# Patient Record
Sex: FEMALE | Race: WHITE | NOT HISPANIC OR LATINO | Employment: UNEMPLOYED | URBAN - NONMETROPOLITAN AREA
[De-identification: names, ages, dates, MRNs, and addresses within clinical notes are randomized per-mention and may not be internally consistent; named-entity substitution may affect disease eponyms.]

---

## 2017-02-02 ENCOUNTER — OFFICE VISIT (OUTPATIENT)
Dept: FAMILY MEDICINE CLINIC | Facility: CLINIC | Age: 32
End: 2017-02-02

## 2017-02-02 VITALS
SYSTOLIC BLOOD PRESSURE: 124 MMHG | WEIGHT: 210.6 LBS | HEIGHT: 68 IN | BODY MASS INDEX: 31.92 KG/M2 | DIASTOLIC BLOOD PRESSURE: 68 MMHG | OXYGEN SATURATION: 98 % | HEART RATE: 73 BPM

## 2017-02-02 DIAGNOSIS — F41.1 GENERALIZED ANXIETY DISORDER: ICD-10-CM

## 2017-02-02 DIAGNOSIS — F32.A DEPRESSION, UNSPECIFIED DEPRESSION TYPE: Primary | ICD-10-CM

## 2017-02-02 PROCEDURE — 99212 OFFICE O/P EST SF 10 MIN: CPT | Performed by: FAMILY MEDICINE

## 2017-02-02 RX ORDER — BUPROPION HYDROCHLORIDE 150 MG/1
150 TABLET ORAL DAILY
Qty: 30 TABLET | Refills: 3 | Status: SHIPPED | OUTPATIENT
Start: 2017-02-02 | End: 2017-05-03 | Stop reason: SDUPTHER

## 2017-02-02 RX ORDER — BUSPIRONE HYDROCHLORIDE 7.5 MG/1
7.5 TABLET ORAL 2 TIMES DAILY
Qty: 60 TABLET | Refills: 1 | Status: SHIPPED | OUTPATIENT
Start: 2017-02-02 | End: 2017-04-06 | Stop reason: SDUPTHER

## 2017-02-02 NOTE — PROGRESS NOTES
"Subjective   Nicol Humphrey is a 31 y.o. female.     History of Present Illness  Patient is a 31 year  female who is presenting today for a follow up on anxiety and depression.  Patient states that she feels as though the vistaril is making her feel very jittery and would like to stop that.  States that the wellbutrin has been doing a good job at controlling her anxiety and depression.  Patient denies any suicidal and homicidal thoughts.    -mentions her last PAP smear was 3 years ago  -is still on E cig but tapering down   The following portions of the patient's history were reviewed and updated as appropriate: allergies, current medications, past family history, past medical history, past social history, past surgical history and problem list.    Review of Systems    Constitutional: no weight loss, fever, chills, weakness  HEENT: no visual loss, blurred vision, double vision, yellow scalarea  Skin: no rash, no discoloration   CVS: no chest pain, palpitations  Chest: no shortness of air, cough, dyspnea  GI: no abdominal pain, blood in stool, no nausea, vomiting, diarrhea  : no burning in urination, change in odor, no change in frequency  Neuro: no headache, dizziness, syncope, paralysis, ataxia, numbness  Musculoskeletal: no muscle, back pain, joint pain, stiffness  Lymphatics: no enlarged nodes  Endo: no reports of sweating, cold or heat intolerance, no polyuria      Objective   Physical Exam  Visit Vitals   • /68 (BP Location: Left arm, Patient Position: Sitting, Cuff Size: Adult)   • Pulse 73   • Ht 68\" (172.7 cm)   • Wt 210 lb 9.6 oz (95.5 kg)   • SpO2 98%   • BMI 32.02 kg/m2     Physical Exam    CONSTITUTIONAL: The vital signs showed that the patient was afebrile; blood pressure and heart rate were within normal limits. The patient appeared alert.  EYES: The conjunctiva was clear. The pupil was equal and reactive. There was no ptosis. The irides appeared normal.  EARS, NOSE AND THROAT: The " ears and the nose appeared normal in appearance. Hearing was grossly intact. The oropharynx showed that the mucosa was moist. There was no lesion that I could see in the palate, tongue. tonsil or posterior pharynx.  NECK: The neck was supple. The thyroid gland was not enlarged by palpation.  RESPIRATORY: The patient’s respiratory effort was normal. Auscultation of the lung showed it to be clear with good air movement.  CARDIOVASCULAR: Auscultation of the heart revealed S1 and S2 with regular rate with no murmur noted. The extremities showed no edema.      Assessment/Plan   Nicol was seen today for anxiety and depression.    Diagnoses and all orders for this visit:    Depression, unspecified depression type    Generalized anxiety disorder    Other orders  -     buPROPion XL (WELLBUTRIN XL) 150 MG 24 hr tablet; Take 1 tablet by mouth Daily.  -     busPIRone (BUSPAR) 7.5 MG tablet; Take 1 tablet by mouth 2 (Two) Times a Day.     -phq9 obtained showed a score of 9  -will stop the vistaril, and continue wellbutrin and add buspar          This document has been electronically signed by Alessandra Cao MD on February 2, 2017 1:50 PM

## 2017-02-28 RX ORDER — HYDROXYZINE PAMOATE 25 MG/1
CAPSULE ORAL
Qty: 90 CAPSULE | Refills: 1 | OUTPATIENT
Start: 2017-02-28

## 2017-04-06 RX ORDER — BUSPIRONE HYDROCHLORIDE 7.5 MG/1
7.5 TABLET ORAL 2 TIMES DAILY
Qty: 60 TABLET | Refills: 1 | Status: SHIPPED | OUTPATIENT
Start: 2017-04-06 | End: 2017-05-03 | Stop reason: SDUPTHER

## 2017-05-03 ENCOUNTER — OFFICE VISIT (OUTPATIENT)
Dept: FAMILY MEDICINE CLINIC | Facility: CLINIC | Age: 32
End: 2017-05-03

## 2017-05-03 VITALS
WEIGHT: 239.8 LBS | SYSTOLIC BLOOD PRESSURE: 122 MMHG | HEIGHT: 67 IN | BODY MASS INDEX: 37.64 KG/M2 | DIASTOLIC BLOOD PRESSURE: 78 MMHG | HEART RATE: 77 BPM | OXYGEN SATURATION: 97 %

## 2017-05-03 DIAGNOSIS — F32.A DEPRESSION, UNSPECIFIED DEPRESSION TYPE: Primary | ICD-10-CM

## 2017-05-03 PROCEDURE — 99213 OFFICE O/P EST LOW 20 MIN: CPT | Performed by: FAMILY MEDICINE

## 2017-05-03 RX ORDER — BUSPIRONE HYDROCHLORIDE 7.5 MG/1
7.5 TABLET ORAL 2 TIMES DAILY
Qty: 60 TABLET | Refills: 3 | Status: SHIPPED | OUTPATIENT
Start: 2017-05-03 | End: 2017-09-01

## 2017-05-03 RX ORDER — BUPROPION HYDROCHLORIDE 150 MG/1
150 TABLET ORAL DAILY
Qty: 30 TABLET | Refills: 3 | Status: SHIPPED | OUTPATIENT
Start: 2017-05-03 | End: 2017-09-01 | Stop reason: SDUPTHER

## 2017-05-24 ENCOUNTER — OFFICE VISIT (OUTPATIENT)
Dept: FAMILY MEDICINE CLINIC | Facility: CLINIC | Age: 32
End: 2017-05-24

## 2017-05-24 VITALS
HEART RATE: 70 BPM | SYSTOLIC BLOOD PRESSURE: 130 MMHG | BODY MASS INDEX: 36.83 KG/M2 | OXYGEN SATURATION: 98 % | HEIGHT: 68 IN | DIASTOLIC BLOOD PRESSURE: 70 MMHG | WEIGHT: 243 LBS

## 2017-05-24 DIAGNOSIS — R22.1 KNOT IN THROAT: Primary | ICD-10-CM

## 2017-05-24 PROCEDURE — 99213 OFFICE O/P EST LOW 20 MIN: CPT | Performed by: FAMILY MEDICINE

## 2017-05-24 RX ORDER — PANTOPRAZOLE SODIUM 40 MG/1
40 TABLET, DELAYED RELEASE ORAL DAILY
Qty: 30 TABLET | Refills: 2 | Status: SHIPPED | OUTPATIENT
Start: 2017-05-24 | End: 2017-09-10 | Stop reason: SDUPTHER

## 2017-08-21 ENCOUNTER — HOSPITAL ENCOUNTER (EMERGENCY)
Facility: HOSPITAL | Age: 32
Discharge: HOME | End: 2017-08-21
Attending: FAMILY MEDICINE | Admitting: FAMILY MEDICINE

## 2017-08-21 ENCOUNTER — APPOINTMENT (OUTPATIENT)
Dept: GENERAL RADIOLOGY | Facility: HOSPITAL | Age: 32
End: 2017-08-21

## 2017-08-21 VITALS
DIASTOLIC BLOOD PRESSURE: 55 MMHG | HEIGHT: 68 IN | TEMPERATURE: 98.2 F | RESPIRATION RATE: 18 BRPM | WEIGHT: 250 LBS | HEART RATE: 65 BPM | SYSTOLIC BLOOD PRESSURE: 111 MMHG | OXYGEN SATURATION: 97 % | BODY MASS INDEX: 37.89 KG/M2

## 2017-08-21 DIAGNOSIS — R07.81 PLEURODYNIA: Primary | ICD-10-CM

## 2017-08-21 LAB
ALBUMIN SERPL-MCNC: 4.1 G/DL (ref 3.4–4.8)
ALBUMIN/GLOB SERPL: 1.5 G/DL (ref 1.1–1.8)
ALP SERPL-CCNC: 68 U/L (ref 38–126)
ALT SERPL W P-5'-P-CCNC: 31 U/L (ref 9–52)
AMYLASE SERPL-CCNC: 51 U/L (ref 50–130)
ANION GAP SERPL CALCULATED.3IONS-SCNC: 8 MMOL/L (ref 5–15)
AST SERPL-CCNC: 38 U/L (ref 14–36)
B-HCG UR QL: NEGATIVE
BASOPHILS # BLD AUTO: 0.02 10*3/MM3 (ref 0–0.2)
BASOPHILS NFR BLD AUTO: 0.2 % (ref 0–2)
BILIRUB SERPL-MCNC: 0.4 MG/DL (ref 0.2–1.3)
BUN BLD-MCNC: 12 MG/DL (ref 7–21)
BUN/CREAT SERPL: 14.1 (ref 7–25)
CALCIUM SPEC-SCNC: 10.7 MG/DL (ref 8.4–10.2)
CHLORIDE SERPL-SCNC: 105 MMOL/L (ref 95–110)
CK MB SERPL-CCNC: 1.08 NG/ML (ref 0–5)
CK SERPL-CCNC: 51 U/L (ref 30–135)
CO2 SERPL-SCNC: 25 MMOL/L (ref 22–31)
CREAT BLD-MCNC: 0.85 MG/DL (ref 0.5–1)
DEPRECATED RDW RBC AUTO: 43.2 FL (ref 36.4–46.3)
EOSINOPHIL # BLD AUTO: 0.1 10*3/MM3 (ref 0–0.7)
EOSINOPHIL NFR BLD AUTO: 1.2 % (ref 0–7)
ERYTHROCYTE [DISTWIDTH] IN BLOOD BY AUTOMATED COUNT: 12.4 % (ref 11.5–14.5)
GFR SERPL CREATININE-BSD FRML MDRD: 79 ML/MIN/1.73 (ref 60–149)
GLOBULIN UR ELPH-MCNC: 2.7 GM/DL (ref 2.3–3.5)
GLUCOSE BLD-MCNC: 83 MG/DL (ref 60–100)
HCT VFR BLD AUTO: 38.5 % (ref 35–45)
HGB BLD-MCNC: 13 G/DL (ref 12–15.5)
HOLD SPECIMEN: NORMAL
HOLD SPECIMEN: NORMAL
IMM GRANULOCYTES # BLD: 0.01 10*3/MM3 (ref 0–0.02)
IMM GRANULOCYTES NFR BLD: 0.1 % (ref 0–0.5)
LIPASE SERPL-CCNC: 67 U/L (ref 23–300)
LYMPHOCYTES # BLD AUTO: 2.95 10*3/MM3 (ref 0.6–4.2)
LYMPHOCYTES NFR BLD AUTO: 34.8 % (ref 10–50)
MCH RBC QN AUTO: 32.3 PG (ref 26.5–34)
MCHC RBC AUTO-ENTMCNC: 33.8 G/DL (ref 31.4–36)
MCV RBC AUTO: 95.8 FL (ref 80–98)
MONOCYTES # BLD AUTO: 0.46 10*3/MM3 (ref 0–0.9)
MONOCYTES NFR BLD AUTO: 5.4 % (ref 0–12)
NEUTROPHILS # BLD AUTO: 4.93 10*3/MM3 (ref 2–8.6)
NEUTROPHILS NFR BLD AUTO: 58.3 % (ref 37–80)
NT-PROBNP SERPL-MCNC: 60.2 PG/ML (ref 0–450)
PLATELET # BLD AUTO: 269 10*3/MM3 (ref 150–450)
PMV BLD AUTO: 9.3 FL (ref 8–12)
POTASSIUM BLD-SCNC: 3.8 MMOL/L (ref 3.5–5.1)
PROT SERPL-MCNC: 6.8 G/DL (ref 6.3–8.6)
RBC # BLD AUTO: 4.02 10*6/MM3 (ref 3.77–5.16)
SODIUM BLD-SCNC: 138 MMOL/L (ref 137–145)
TROPONIN I SERPL-MCNC: <0.012 NG/ML
WBC NRBC COR # BLD: 8.47 10*3/MM3 (ref 3.2–9.8)
WHOLE BLOOD HOLD SPECIMEN: NORMAL
WHOLE BLOOD HOLD SPECIMEN: NORMAL

## 2017-08-21 PROCEDURE — 82553 CREATINE MB FRACTION: CPT | Performed by: FAMILY MEDICINE

## 2017-08-21 PROCEDURE — 99284 EMERGENCY DEPT VISIT MOD MDM: CPT

## 2017-08-21 PROCEDURE — 84484 ASSAY OF TROPONIN QUANT: CPT | Performed by: FAMILY MEDICINE

## 2017-08-21 PROCEDURE — 81025 URINE PREGNANCY TEST: CPT | Performed by: FAMILY MEDICINE

## 2017-08-21 PROCEDURE — 80053 COMPREHEN METABOLIC PANEL: CPT | Performed by: FAMILY MEDICINE

## 2017-08-21 PROCEDURE — 83880 ASSAY OF NATRIURETIC PEPTIDE: CPT | Performed by: FAMILY MEDICINE

## 2017-08-21 PROCEDURE — 82550 ASSAY OF CK (CPK): CPT | Performed by: FAMILY MEDICINE

## 2017-08-21 PROCEDURE — 96374 THER/PROPH/DIAG INJ IV PUSH: CPT

## 2017-08-21 PROCEDURE — 85025 COMPLETE CBC W/AUTO DIFF WBC: CPT | Performed by: FAMILY MEDICINE

## 2017-08-21 PROCEDURE — 93005 ELECTROCARDIOGRAM TRACING: CPT

## 2017-08-21 PROCEDURE — 25010000002 KETOROLAC TROMETHAMINE PER 15 MG: Performed by: FAMILY MEDICINE

## 2017-08-21 PROCEDURE — 25010000002 ONDANSETRON PER 1 MG: Performed by: FAMILY MEDICINE

## 2017-08-21 PROCEDURE — 83690 ASSAY OF LIPASE: CPT | Performed by: FAMILY MEDICINE

## 2017-08-21 PROCEDURE — 96375 TX/PRO/DX INJ NEW DRUG ADDON: CPT

## 2017-08-21 PROCEDURE — 82150 ASSAY OF AMYLASE: CPT | Performed by: FAMILY MEDICINE

## 2017-08-21 PROCEDURE — 71010 HC CHEST PA OR AP: CPT

## 2017-08-21 PROCEDURE — 93010 ELECTROCARDIOGRAM REPORT: CPT | Performed by: INTERNAL MEDICINE

## 2017-08-21 RX ORDER — ASPIRIN 325 MG
325 TABLET ORAL ONCE
Status: COMPLETED | OUTPATIENT
Start: 2017-08-21 | End: 2017-08-21

## 2017-08-21 RX ORDER — METHYLPREDNISOLONE 4 MG/1
TABLET ORAL
Qty: 21 TABLET | Refills: 0 | Status: SHIPPED | OUTPATIENT
Start: 2017-08-21 | End: 2018-04-09

## 2017-08-21 RX ORDER — ONDANSETRON 2 MG/ML
4 INJECTION INTRAMUSCULAR; INTRAVENOUS ONCE
Status: COMPLETED | OUTPATIENT
Start: 2017-08-21 | End: 2017-08-21

## 2017-08-21 RX ORDER — SODIUM CHLORIDE 0.9 % (FLUSH) 0.9 %
10 SYRINGE (ML) INJECTION AS NEEDED
Status: DISCONTINUED | OUTPATIENT
Start: 2017-08-21 | End: 2017-08-22 | Stop reason: HOSPADM

## 2017-08-21 RX ORDER — NAPROXEN 500 MG/1
500 TABLET ORAL 2 TIMES DAILY PRN
Qty: 30 TABLET | Refills: 0 | Status: SHIPPED | OUTPATIENT
Start: 2017-08-21 | End: 2018-04-09

## 2017-08-21 RX ORDER — KETOROLAC TROMETHAMINE 15 MG/ML
15 INJECTION, SOLUTION INTRAMUSCULAR; INTRAVENOUS ONCE
Status: COMPLETED | OUTPATIENT
Start: 2017-08-21 | End: 2017-08-21

## 2017-08-21 RX ORDER — AZITHROMYCIN 250 MG/1
TABLET, FILM COATED ORAL
Qty: 6 TABLET | Refills: 0 | Status: SHIPPED | OUTPATIENT
Start: 2017-08-21 | End: 2018-04-09

## 2017-08-21 RX ADMIN — ASPIRIN 325 MG: 325 TABLET, COATED ORAL at 20:23

## 2017-08-21 RX ADMIN — ONDANSETRON 4 MG: 2 INJECTION INTRAMUSCULAR; INTRAVENOUS at 21:31

## 2017-08-21 RX ADMIN — KETOROLAC TROMETHAMINE 15 MG: 15 INJECTION, SOLUTION INTRAMUSCULAR; INTRAVENOUS at 20:47

## 2017-08-22 NOTE — ED TRIAGE NOTES
Pt presents to ED per private vehicle with spouse with pt c/o chest pain and soa started this morning and went away than came back this evening.

## 2017-08-22 NOTE — ED PROVIDER NOTES
Subjective   Patient is a 31 y.o. female presenting with chest pain.   Chest Pain   Pain location:  Substernal area  Pain quality: aching    Pain radiates to:  Upper back  Pain severity:  Moderate  Onset quality:  Gradual  Duration:  13 hours  Timing:  Constant  Progression:  Unchanged  Chronicity:  New  Relieved by:  Nothing  Worsened by:  Coughing and deep breathing  Ineffective treatments:  None tried  Associated symptoms: back pain, cough, dizziness, headache, nausea and shortness of breath    Associated symptoms: no abdominal pain, no altered mental status, no anxiety, no diaphoresis, no dysphagia, no fatigue, no fever, no near-syncope, no syncope, no vomiting and no weakness    Risk factors: obesity and smoking    Risk factors: no coronary artery disease, no diabetes mellitus, no high cholesterol, no hypertension and not male        Review of Systems   Constitutional: Negative for appetite change, chills, diaphoresis, fatigue and fever.   HENT: Negative for congestion, ear discharge, ear pain, nosebleeds, rhinorrhea, sinus pressure, sore throat and trouble swallowing.    Eyes: Negative for discharge and redness.   Respiratory: Positive for cough and shortness of breath. Negative for apnea, chest tightness and wheezing.    Cardiovascular: Positive for chest pain. Negative for syncope and near-syncope.   Gastrointestinal: Positive for nausea. Negative for abdominal pain, diarrhea and vomiting.   Endocrine: Negative for polyuria.   Genitourinary: Negative for dysuria, frequency and urgency.   Musculoskeletal: Positive for back pain. Negative for myalgias and neck pain.   Skin: Negative for color change and rash.   Allergic/Immunologic: Negative for immunocompromised state.   Neurological: Positive for dizziness and headaches. Negative for seizures, syncope, weakness and light-headedness.   Hematological: Negative for adenopathy. Does not bruise/bleed easily.   Psychiatric/Behavioral: Negative for behavioral  problems and confusion.   All other systems reviewed and are negative.      Past Medical History:   Diagnosis Date   • Anxiety disorder     Anxiety disorder, unspecified      • Encounter for  visit      visit status      • Pediculosis due to pediculus humanus capitis    • Rectocele complicating  care, baby not yet delivered     Routine  care      • Supervision of other normal pregnancy    • Surgical follow-up care    • Transient hypertension of pregnancy     Transient hypertension of pregnancy - not delivered      • Upper respiratory infection    • Viral gastroenteritis        No Known Allergies    Past Surgical History:   Procedure Laterality Date   •  SECTION      Repeat low transverse  with left partial salpingectomy. 2014       • ECTOPIC PREGNANCY       Operative laparoscopy, right salpinectomy and removal of ectopic pregnancy. Right ectopic pregnancy. 2008       • INJECTION OF MEDICATION      B12 (1)      2010       • PAP SMEAR      Negative for intraepithelial lesion or malignancy. 2009       • TONSILLECTOMY AND ADENOIDECTOMY      Tonsillectomy and adenoidectomy. Chronic tonsillitis. 2000           Family History   Problem Relation Age of Onset   • Diabetes Other    • Thyroid disease Other      Grandfather and aunt        Social History     Social History   • Marital status:      Spouse name: N/A   • Number of children: N/A   • Years of education: N/A     Social History Main Topics   • Smoking status: Current Every Day Smoker   • Smokeless tobacco: None   • Alcohol use No   • Drug use: None   • Sexual activity: Not Asked     Other Topics Concern   • None     Social History Narrative           Objective   Physical Exam   Constitutional: She is oriented to person, place, and time. She appears well-developed and well-nourished.   HENT:   Head: Normocephalic and atraumatic.   Nose: Nose normal.   Mouth/Throat: Oropharynx is  clear and moist.   Eyes: Conjunctivae and EOM are normal. Pupils are equal, round, and reactive to light. Right eye exhibits no discharge. Left eye exhibits no discharge. No scleral icterus.   Neck: Normal range of motion. Neck supple. No tracheal deviation present.   Cardiovascular: Normal rate, regular rhythm and normal heart sounds.    No murmur heard.  Pulmonary/Chest: Effort normal and breath sounds normal. No stridor. No respiratory distress. She has no wheezes. She has no rales.   Abdominal: Soft. Bowel sounds are normal. She exhibits no distension and no mass. There is no tenderness. There is no rebound and no guarding.   Musculoskeletal: She exhibits no edema.   Neurological: She is alert and oriented to person, place, and time. Coordination normal.   Skin: Skin is warm and dry. No rash noted. No erythema.   Psychiatric: She has a normal mood and affect. Her behavior is normal. Thought content normal.   Nursing note and vitals reviewed.      ECG 12 Lead    Date/Time: 8/21/2017 10:10 PM  Performed by: SELWYN FLORES  Authorized by: SELWYN FLORES   Interpreted by physician  Rhythm: sinus rhythm  Rate: normal  BPM: 85  Conduction: conduction normal  ST Segments: ST segments normal                 ED Course  ED Course          Labs Reviewed   COMPREHENSIVE METABOLIC PANEL - Abnormal; Notable for the following:        Result Value    Calcium 10.7 (*)     AST (SGOT) 38 (*)     All other components within normal limits   TROPONIN (IN-HOUSE) - Normal   BNP (IN-HOUSE) - Normal   PREGNANCY, URINE - Normal   CBC WITH AUTO DIFFERENTIAL - Normal   CK - Normal   CK MB - Normal   LIPASE - Normal   AMYLASE - Normal   RAINBOW DRAW    Narrative:     The following orders were created for panel order [place] Draw.  Procedure                               Abnormality         Status                     ---------                               -----------         ------                     Light Blue Top[[id number]]                                    Final result               Green Top (Gel)[[id number]]                                  Final result               Lavender Top[[id number]]                                     Final result               Gold Top - SST[[id number]]                                   Final result                 Please view results for these tests on the individual orders.   TROPONIN (IN-HOUSE)   CBC AND DIFFERENTIAL    Narrative:     The following orders were created for panel order CBC & Differential.  Procedure                               Abnormality         Status                     ---------                               -----------         ------                     CBC Auto Differential[[id number]]        Normal              Final result                 Please view results for these tests on the individual orders.   LIGHT BLUE TOP   GREEN TOP   LAVENDER TOP   GOLD TOP - SST       XR Chest 1 View   Final Result   CONCLUSION:          1. Negative examination.                                         Electronically signed by:  YADY Storey MD  8/21/2017 8:49   PM CDT Workstation: [id number]    Final diagnoses:   Pleurodynia            Boogie Cervantes MD  08/21/17 3149

## 2017-08-25 ENCOUNTER — TELEPHONE (OUTPATIENT)
Dept: FAMILY MEDICINE CLINIC | Facility: CLINIC | Age: 32
End: 2017-08-25

## 2017-08-25 NOTE — TELEPHONE ENCOUNTER
ER FOLLOW UP  8/22/17  1037  CALLED PATIENT, SHE ALREADY HAD SCHEDULED APPOINTMENT FOR 8/23/17 @ 555, JUST REMINDED HER

## 2017-09-01 ENCOUNTER — OFFICE VISIT (OUTPATIENT)
Dept: FAMILY MEDICINE CLINIC | Facility: CLINIC | Age: 32
End: 2017-09-01

## 2017-09-01 ENCOUNTER — APPOINTMENT (OUTPATIENT)
Dept: LAB | Facility: HOSPITAL | Age: 32
End: 2017-09-01

## 2017-09-01 VITALS
HEIGHT: 68 IN | OXYGEN SATURATION: 97 % | WEIGHT: 258.06 LBS | DIASTOLIC BLOOD PRESSURE: 74 MMHG | HEART RATE: 90 BPM | SYSTOLIC BLOOD PRESSURE: 126 MMHG | BODY MASS INDEX: 39.11 KG/M2

## 2017-09-01 DIAGNOSIS — E66.01 MORBID OBESITY DUE TO EXCESS CALORIES (HCC): ICD-10-CM

## 2017-09-01 DIAGNOSIS — F32.A DEPRESSION, UNSPECIFIED DEPRESSION TYPE: ICD-10-CM

## 2017-09-01 DIAGNOSIS — F17.200 TOBACCO DEPENDENCE: ICD-10-CM

## 2017-09-01 DIAGNOSIS — F41.9 ANXIETY: Primary | ICD-10-CM

## 2017-09-01 LAB
ARTICHOKE IGE QN: 90 MG/DL (ref 1–129)
CHOLEST SERPL-MCNC: 168 MG/DL (ref 0–199)
HDLC SERPL-MCNC: 44 MG/DL (ref 60–200)
LDLC/HDLC SERPL: 1.59 {RATIO} (ref 0–3.22)
TRIGL SERPL-MCNC: 271 MG/DL (ref 20–199)

## 2017-09-01 PROCEDURE — 80061 LIPID PANEL: CPT | Performed by: FAMILY MEDICINE

## 2017-09-01 PROCEDURE — 99213 OFFICE O/P EST LOW 20 MIN: CPT | Performed by: FAMILY MEDICINE

## 2017-09-01 PROCEDURE — 36415 COLL VENOUS BLD VENIPUNCTURE: CPT | Performed by: FAMILY MEDICINE

## 2017-09-01 RX ORDER — NICOTINE 21 MG/24HR
1 PATCH, TRANSDERMAL 24 HOURS TRANSDERMAL EVERY 24 HOURS
Qty: 30 PATCH | Refills: 2 | Status: SHIPPED | OUTPATIENT
Start: 2017-09-01 | End: 2018-04-09

## 2017-09-01 RX ORDER — BUPROPION HYDROCHLORIDE 150 MG/1
150 TABLET ORAL DAILY
Qty: 30 TABLET | Refills: 3 | Status: SHIPPED | OUTPATIENT
Start: 2017-09-01 | End: 2018-01-14 | Stop reason: SDUPTHER

## 2017-09-01 RX ORDER — HYDROXYZINE HYDROCHLORIDE 25 MG/1
12.5 TABLET, FILM COATED ORAL NIGHTLY PRN
Qty: 60 TABLET | Refills: 0 | Status: SHIPPED | OUTPATIENT
Start: 2017-09-01 | End: 2017-10-11 | Stop reason: SDUPTHER

## 2017-09-02 NOTE — PROGRESS NOTES
Subjective:     Nicol Humphrey is a 31 y.o. female who presents for follow up for anxiety and depression.    Anxiety   Presents for follow-up visit. Symptoms include decreased concentration, depressed mood, excessive worry, nervous/anxious behavior, palpitations and panic. Patient reports no chest pain, compulsions, confusion, dizziness, dry mouth, feeling of choking, hyperventilation, impotence, insomnia, irritability, malaise, muscle tension, nausea, obsessions, restlessness, shortness of breath or suicidal ideas. Symptoms occur most days. The severity of symptoms is moderate. The quality of sleep is fair. Nighttime awakenings: occasional.     Her past medical history is significant for depression. Compliance with medications is %.   Depression   Visit Type: follow-up  Patient presents with the following symptoms: anhedonia, decreased concentration, depressed mood, excessive worry, fatigue, nervousness/anxiety, palpitations and panic.  Patient is not experiencing: chest pain, choking sensation, compulsions, confusion, dry mouth, hyperventilation, impotence, insomnia, irritability, malaise, muscle tension, obsessions, psychomotor agitation, psychomotor retardation, restlessness, shortness of breath, suicidal ideas, suicidal planning, thoughts of death, weight gain and weight loss.  Frequency of symptoms: constantly   Severity: moderate   Sleep quality: fair  Nighttime awakenings: occasional  Compliance with medications:  %                Preventative:  Over the past 2 weeks, have you felt down, depressed, or hopeless?Yes   Over the past 2 weeks, have you felt little interest or pleasure in doing things?Yes  Clinical depression screening refused by patient.No     On osteoporosis therapy?No     Past Medical Hx:  Past Medical History:   Diagnosis Date   • Anxiety disorder     Anxiety disorder, unspecified      • Encounter for  visit      visit status      • Pediculosis due to  pediculus humanus capitis    • Rectocele complicating  care, baby not yet delivered     Routine  care      • Supervision of other normal pregnancy    • Surgical follow-up care    • Transient hypertension of pregnancy     Transient hypertension of pregnancy - not delivered      • Upper respiratory infection    • Viral gastroenteritis        Past Surgical Hx:  Past Surgical History:   Procedure Laterality Date   •  SECTION      Repeat low transverse  with left partial salpingectomy. 2014       • ECTOPIC PREGNANCY       Operative laparoscopy, right salpinectomy and removal of ectopic pregnancy. Right ectopic pregnancy. 2008       • INJECTION OF MEDICATION      B12 (1)      2010       • PAP SMEAR      Negative for intraepithelial lesion or malignancy. 2009       • TONSILLECTOMY AND ADENOIDECTOMY      Tonsillectomy and adenoidectomy. Chronic tonsillitis. 2000           Health Maintenance:  Health Maintenance   Topic Date Due   • TDAP/TD VACCINES (1 - Tdap) 2005   • PAP SMEAR  2016   • INFLUENZA VACCINE  2017   • PNEUMOCOCCAL VACCINE (19-64 MEDIUM RISK)  Completed       Current Meds:    Current Outpatient Prescriptions:   •  azithromycin (ZITHROMAX Z-ADEN) 250 MG tablet, Take 2 tablets the first day, then 1 tablet daily for 4 days., Disp: 6 tablet, Rfl: 0  •  buPROPion XL (WELLBUTRIN XL) 150 MG 24 hr tablet, Take 1 tablet by mouth Daily., Disp: 30 tablet, Rfl: 3  •  MethylPREDNISolone (MEDROL, ADEN,) 4 MG tablet, Take as directed on package instructions., Disp: 21 tablet, Rfl: 0  •  naproxen (EC NAPROSYN) 500 MG EC tablet, Take 1 tablet by mouth 2 (Two) Times a Day As Needed for Mild Pain ., Disp: 30 tablet, Rfl: 0  •  pantoprazole (PROTONIX) 40 MG EC tablet, Take 1 tablet by mouth Daily., Disp: 30 tablet, Rfl: 2  •  hydrOXYzine (ATARAX) 25 MG tablet, Take 0.5 tablets by mouth At Night As Needed for Anxiety., Disp: 60 tablet, Rfl: 0  •   nicotine (EQL NICOTINE) 21 MG/24HR patch, Place 1 patch on the skin Daily., Disp: 30 patch, Rfl: 2  •  sertraline (ZOLOFT) 50 MG tablet, Take 1 tablet by mouth Daily., Disp: 30 tablet, Rfl: 2    Allergies:  Review of patient's allergies indicates no known allergies.    Family Hx:  Family History   Problem Relation Age of Onset   • Diabetes Other    • Thyroid disease Other      Grandfather and aunt         Social History:  Social History     Social History   • Marital status:      Spouse name: N/A   • Number of children: N/A   • Years of education: N/A     Occupational History   • Not on file.     Social History Main Topics   • Smoking status: Current Every Day Smoker   • Smokeless tobacco: Not on file   • Alcohol use No   • Drug use: Not on file   • Sexual activity: Not on file     Other Topics Concern   • Not on file     Social History Narrative       Review of Systems   Constitutional: Positive for fatigue. Negative for activity change, appetite change, fever, irritability, weight gain and weight loss.   HENT: Negative for ear pain and sore throat.    Eyes: Negative for pain and visual disturbance.   Respiratory: Negative for cough, choking and shortness of breath.    Cardiovascular: Positive for palpitations. Negative for chest pain.   Gastrointestinal: Negative for abdominal pain and nausea.   Endocrine: Negative for cold intolerance and heat intolerance.   Genitourinary: Negative for difficulty urinating, dysuria and impotence.   Musculoskeletal: Negative for arthralgias and gait problem.   Skin: Negative for color change and rash.   Neurological: Negative for dizziness, weakness and headaches.   Hematological: Negative for adenopathy. Does not bruise/bleed easily.   Psychiatric/Behavioral: Positive for decreased concentration. Negative for agitation, confusion, sleep disturbance and suicidal ideas. The patient is nervous/anxious. The patient does not have insomnia.          Objective:     /74 (BP  "Location: Left arm, Patient Position: Sitting, Cuff Size: Adult)  Pulse 90  Ht 68\" (172.7 cm)  Wt 258 lb 1 oz (117 kg)  SpO2 97%  BMI 39.24 kg/m2    Physical Exam   Constitutional: She is oriented to person, place, and time. She appears well-developed and well-nourished.   HENT:   Head: Normocephalic and atraumatic.   Eyes: EOM are normal. Pupils are equal, round, and reactive to light.   Neck: Normal range of motion. Neck supple.   Cardiovascular: Normal rate, regular rhythm and normal heart sounds.    Pulmonary/Chest: Effort normal and breath sounds normal.   Abdominal: Soft. Bowel sounds are normal. She exhibits no distension. There is no tenderness. There is no rebound.   Musculoskeletal: Normal range of motion.   Neurological: She is alert and oriented to person, place, and time.   Skin: Skin is warm.   Psychiatric: Her behavior is normal. Judgment and thought content normal. Her mood appears anxious. Her speech is rapid and/or pressured. She exhibits a depressed mood.   Nursing note and vitals reviewed.           Assessment/Plan:     Nicol was seen today for anxiety, depression and follow-up.    Diagnoses and all orders for this visit:    Anxiety    Morbid obesity due to excess calories  -     Lipid Panel    Tobacco dependence    Depression, unspecified depression type    Other orders  -     sertraline (ZOLOFT) 50 MG tablet; Take 1 tablet by mouth Daily.  -     buPROPion XL (WELLBUTRIN XL) 150 MG 24 hr tablet; Take 1 tablet by mouth Daily.  -     hydrOXYzine (ATARAX) 25 MG tablet; Take 0.5 tablets by mouth At Night As Needed for Anxiety.  -     nicotine (EQL NICOTINE) 21 MG/24HR patch; Place 1 patch on the skin Daily.          Follow-up:     Return in about 3 months (around 12/1/2017) for Recheck.    GOALS:  Quit smoking           occasional/rare  eat more fruits and vegetables, decrease soda or juice intake, increase water intake, increase physical activity, reduce screen time, reduce portion size, " cut out extra servings, reduce fast food intake, family to eat at dinner table more often, keep TV off during meals, plan meals, eat breakfast and have 3 meals a day    RISK SCORE: 4      This document has been electronically signed by Samantha Walsh MD on September 2, 2017 4:25 PM    Samantha Walsh MD, [place]  [place] [id number] [phone]

## 2017-09-11 RX ORDER — PANTOPRAZOLE SODIUM 40 MG/1
TABLET, DELAYED RELEASE ORAL
Qty: 30 TABLET | Refills: 2 | Status: SHIPPED | OUTPATIENT
Start: 2017-09-11 | End: 2017-12-08 | Stop reason: SDUPTHER

## 2017-09-11 NOTE — PROGRESS NOTES
I have seen this patient and discussed the case with resident and agree with the assessment and plan.  MICK Ortiz M.D.

## 2017-10-05 RX ORDER — HYDROXYZINE HYDROCHLORIDE 25 MG/1
TABLET, FILM COATED ORAL
Qty: 60 TABLET | Refills: 0 | Status: CANCELLED | OUTPATIENT
Start: 2017-10-05

## 2017-10-11 ENCOUNTER — TELEPHONE (OUTPATIENT)
Dept: FAMILY MEDICINE CLINIC | Facility: CLINIC | Age: 32
End: 2017-10-11

## 2017-10-11 RX ORDER — HYDROXYZINE HYDROCHLORIDE 25 MG/1
12.5 TABLET, FILM COATED ORAL NIGHTLY PRN
Qty: 60 TABLET | Refills: 0 | Status: SHIPPED | OUTPATIENT
Start: 2017-10-11 | End: 2018-02-28 | Stop reason: SDUPTHER

## 2017-10-11 NOTE — TELEPHONE ENCOUNTER
MANN FROM [facility] CALLED, PT NEEDS REFILL OF HER HYDROXYZINE 25 MG

## 2017-12-11 RX ORDER — PANTOPRAZOLE SODIUM 40 MG/1
TABLET, DELAYED RELEASE ORAL
Qty: 30 TABLET | Refills: 2 | Status: SHIPPED | OUTPATIENT
Start: 2017-12-11 | End: 2018-02-15 | Stop reason: SDUPTHER

## 2018-01-15 RX ORDER — BUPROPION HYDROCHLORIDE 150 MG/1
TABLET ORAL
Qty: 30 TABLET | Refills: 3 | Status: SHIPPED | OUTPATIENT
Start: 2018-01-15 | End: 2018-02-15 | Stop reason: SDUPTHER

## 2018-02-11 ENCOUNTER — APPOINTMENT (OUTPATIENT)
Dept: CT IMAGING | Facility: HOSPITAL | Age: 33
End: 2018-02-11

## 2018-02-11 ENCOUNTER — HOSPITAL ENCOUNTER (EMERGENCY)
Facility: HOSPITAL | Age: 33
Discharge: HOME | End: 2018-02-11
Attending: FAMILY MEDICINE | Admitting: FAMILY MEDICINE

## 2018-02-11 VITALS
HEIGHT: 68 IN | TEMPERATURE: 98.1 F | OXYGEN SATURATION: 99 % | WEIGHT: 275 LBS | BODY MASS INDEX: 41.68 KG/M2 | HEART RATE: 72 BPM | SYSTOLIC BLOOD PRESSURE: 103 MMHG | RESPIRATION RATE: 18 BRPM | DIASTOLIC BLOOD PRESSURE: 55 MMHG

## 2018-02-11 DIAGNOSIS — R42 DIZZINESS: Primary | ICD-10-CM

## 2018-02-11 LAB
ALBUMIN SERPL-MCNC: 4.2 G/DL (ref 3.4–4.8)
ALBUMIN/GLOB SERPL: 1.4 G/DL (ref 1.1–1.8)
ALP SERPL-CCNC: 71 U/L (ref 38–126)
ALT SERPL W P-5'-P-CCNC: 33 U/L (ref 9–52)
ANION GAP SERPL CALCULATED.3IONS-SCNC: 12 MMOL/L (ref 5–15)
AST SERPL-CCNC: 38 U/L (ref 14–36)
BASOPHILS # BLD AUTO: 0.01 10*3/MM3 (ref 0–0.2)
BASOPHILS NFR BLD AUTO: 0.1 % (ref 0–2)
BILIRUB SERPL-MCNC: 0.1 MG/DL (ref 0.2–1.3)
BUN BLD-MCNC: 6 MG/DL (ref 7–21)
BUN/CREAT SERPL: 10 (ref 7–25)
CALCIUM SPEC-SCNC: 10.3 MG/DL (ref 8.4–10.2)
CHLORIDE SERPL-SCNC: 101 MMOL/L (ref 95–110)
CO2 SERPL-SCNC: 27 MMOL/L (ref 22–31)
CREAT BLD-MCNC: 0.6 MG/DL (ref 0.5–1)
DEPRECATED RDW RBC AUTO: 41.4 FL (ref 36.4–46.3)
EOSINOPHIL # BLD AUTO: 0.06 10*3/MM3 (ref 0–0.7)
EOSINOPHIL NFR BLD AUTO: 0.7 % (ref 0–7)
ERYTHROCYTE [DISTWIDTH] IN BLOOD BY AUTOMATED COUNT: 12.2 % (ref 11.5–14.5)
GFR SERPL CREATININE-BSD FRML MDRD: 117 ML/MIN/1.73 (ref 60–149)
GLOBULIN UR ELPH-MCNC: 3 GM/DL (ref 2.3–3.5)
GLUCOSE BLD-MCNC: 85 MG/DL (ref 60–100)
HCT VFR BLD AUTO: 37.4 % (ref 35–45)
HGB BLD-MCNC: 12.7 G/DL (ref 12–15.5)
IMM GRANULOCYTES # BLD: 0.01 10*3/MM3 (ref 0–0.02)
IMM GRANULOCYTES NFR BLD: 0.1 % (ref 0–0.5)
LYMPHOCYTES # BLD AUTO: 2.32 10*3/MM3 (ref 0.6–4.2)
LYMPHOCYTES NFR BLD AUTO: 26.2 % (ref 10–50)
MCH RBC QN AUTO: 31.8 PG (ref 26.5–34)
MCHC RBC AUTO-ENTMCNC: 34 G/DL (ref 31.4–36)
MCV RBC AUTO: 93.7 FL (ref 80–98)
MONOCYTES # BLD AUTO: 0.7 10*3/MM3 (ref 0–0.9)
MONOCYTES NFR BLD AUTO: 7.9 % (ref 0–12)
NEUTROPHILS # BLD AUTO: 5.74 10*3/MM3 (ref 2–8.6)
NEUTROPHILS NFR BLD AUTO: 65 % (ref 37–80)
PLATELET # BLD AUTO: 303 10*3/MM3 (ref 150–450)
PMV BLD AUTO: 9.2 FL (ref 8–12)
POTASSIUM BLD-SCNC: 3.9 MMOL/L (ref 3.5–5.1)
PROT SERPL-MCNC: 7.2 G/DL (ref 6.3–8.6)
RBC # BLD AUTO: 3.99 10*6/MM3 (ref 3.77–5.16)
SODIUM BLD-SCNC: 140 MMOL/L (ref 137–145)
TROPONIN I SERPL-MCNC: <0.012 NG/ML
WBC NRBC COR # BLD: 8.84 10*3/MM3 (ref 3.2–9.8)

## 2018-02-11 PROCEDURE — 99283 EMERGENCY DEPT VISIT LOW MDM: CPT

## 2018-02-11 PROCEDURE — 80053 COMPREHEN METABOLIC PANEL: CPT | Performed by: FAMILY MEDICINE

## 2018-02-11 PROCEDURE — 93005 ELECTROCARDIOGRAM TRACING: CPT | Performed by: FAMILY MEDICINE

## 2018-02-11 PROCEDURE — 85025 COMPLETE CBC W/AUTO DIFF WBC: CPT | Performed by: FAMILY MEDICINE

## 2018-02-11 PROCEDURE — 70450 CT HEAD/BRAIN W/O DYE: CPT

## 2018-02-11 PROCEDURE — 84484 ASSAY OF TROPONIN QUANT: CPT | Performed by: FAMILY MEDICINE

## 2018-02-11 PROCEDURE — 93010 ELECTROCARDIOGRAM REPORT: CPT | Performed by: INTERNAL MEDICINE

## 2018-02-11 RX ORDER — SODIUM CHLORIDE 0.9 % (FLUSH) 0.9 %
10 SYRINGE (ML) INJECTION AS NEEDED
Status: DISCONTINUED | OUTPATIENT
Start: 2018-02-11 | End: 2018-02-11 | Stop reason: HOSPADM

## 2018-02-11 NOTE — ED PROVIDER NOTES
Subjective   Patient is a 32 y.o. female presenting with dizziness.   History provided by:  Patient   used: No    Dizziness   Quality:  Unable to specify  Severity:  Mild  Onset quality:  Gradual  Duration:  1 week  Timing:  Constant  Progression:  Unchanged  Chronicity:  New  Context: not when bending over, not with bowel movement, not with inactivity and not with loss of consciousness    Relieved by:  Nothing  Risk factors: no anemia        Review of Systems   Neurological: Positive for dizziness.   All other systems reviewed and are negative.      Past Medical History:   Diagnosis Date   • Anxiety disorder     Anxiety disorder, unspecified      • Encounter for  visit      visit status      • Pediculosis due to pediculus humanus capitis    • Rectocele complicating  care, baby not yet delivered     Routine  care      • Supervision of other normal pregnancy    • Surgical follow-up care    • Transient hypertension of pregnancy     Transient hypertension of pregnancy - not delivered      • Upper respiratory infection    • Viral gastroenteritis        No Known Allergies    Past Surgical History:   Procedure Laterality Date   •  SECTION      Repeat low transverse  with left partial salpingectomy. 2014       • ECTOPIC PREGNANCY       Operative laparoscopy, right salpinectomy and removal of ectopic pregnancy. Right ectopic pregnancy. 2008       • INJECTION OF MEDICATION      B12 (1)      2010       • PAP SMEAR      Negative for intraepithelial lesion or malignancy. 2009       • TONSILLECTOMY AND ADENOIDECTOMY      Tonsillectomy and adenoidectomy. Chronic tonsillitis. 2000       • TUBAL ABDOMINAL LIGATION         Family History   Problem Relation Age of Onset   • Diabetes Other    • Thyroid disease Other      Grandfather and aunt        Social History     Social History   • Marital status:      Spouse name: N/A   •  "Number of children: N/A   • Years of education: N/A     Social History Main Topics   • Smoking status: Current Every Day Smoker   • Smokeless tobacco: None   • Alcohol use No   • Drug use: None   • Sexual activity: Not Asked     Other Topics Concern   • None     Social History Narrative       /74  Pulse 72  Temp 98.1 °F (36.7 °C) (Oral)   Resp 18  Ht 172.7 cm (68\")  Wt 125 kg (275 lb)  SpO2 98%  BMI 41.81 kg/m2    Objective   Physical Exam   Constitutional: She is oriented to person, place, and time. She appears well-developed and well-nourished.   HENT:   Head: Normocephalic.   Right Ear: External ear normal.   Nose: Nose normal.   Mouth/Throat: Oropharynx is clear and moist.   Eyes: Conjunctivae and EOM are normal. Pupils are equal, round, and reactive to light.   Neck: Normal range of motion.   Cardiovascular: Normal rate, regular rhythm and normal heart sounds.    Pulmonary/Chest: Effort normal and breath sounds normal.   Abdominal: Soft. Bowel sounds are normal.   Musculoskeletal: Normal range of motion.   Neurological: She is alert and oriented to person, place, and time. She has normal reflexes.   Skin: Skin is warm.   Psychiatric: She has a normal mood and affect. Her behavior is normal. Judgment and thought content normal.   Nursing note and vitals reviewed.      Procedures         ED Course  ED Course      Labs Reviewed   COMPREHENSIVE METABOLIC PANEL - Abnormal; Notable for the following:        Result Value    BUN 6 (*)     Calcium 10.3 (*)     AST (SGOT) 38 (*)     Total Bilirubin 0.1 (*)     All other components within normal limits   TROPONIN (IN-HOUSE) - Normal   CBC WITH AUTO DIFFERENTIAL - Normal   CBC AND DIFFERENTIAL    Narrative:     The following orders were created for panel order CBC & Differential.  Procedure                               Abnormality         Status                     ---------                               -----------         ------                     CBC Auto " Differential[[id number]]        Normal              Final result                 Please view results for these tests on the individual orders.       CT Head Without Contrast   Final Result   CONCLUSION:     1.  Negative examination for acute intracranial pathology.             If signs or symptoms persist beyond reasonable expectations, a   MRI examination is suggested as is deemed clinically appropriate.               Electronically signed by:  YADY Storey MD  2/11/2018 6:21   PM [facility] Workstation: [id number]                  [facility]    Final diagnoses:   Dizziness            Jamie Kong MD  02/11/18 1837

## 2018-02-11 NOTE — ED NOTES
"Pt states she feels confused and her head feels like it is \"vibrating\".  Pt states she has not been ill recently or taking any medications/drugs that would alter her mental state.     Estrella Puente RN  02/11/18 6421    "

## 2018-02-15 ENCOUNTER — APPOINTMENT (OUTPATIENT)
Dept: LAB | Facility: HOSPITAL | Age: 33
End: 2018-02-15

## 2018-02-15 ENCOUNTER — OFFICE VISIT (OUTPATIENT)
Dept: FAMILY MEDICINE CLINIC | Facility: CLINIC | Age: 33
End: 2018-02-15

## 2018-02-15 VITALS
SYSTOLIC BLOOD PRESSURE: 125 MMHG | HEART RATE: 85 BPM | HEIGHT: 68 IN | OXYGEN SATURATION: 98 % | BODY MASS INDEX: 43.26 KG/M2 | WEIGHT: 285.4 LBS | DIASTOLIC BLOOD PRESSURE: 69 MMHG

## 2018-02-15 DIAGNOSIS — F32.A DEPRESSION, UNSPECIFIED DEPRESSION TYPE: ICD-10-CM

## 2018-02-15 DIAGNOSIS — F41.1 GENERALIZED ANXIETY DISORDER: Primary | ICD-10-CM

## 2018-02-15 DIAGNOSIS — E83.52 HYPERCALCEMIA: ICD-10-CM

## 2018-02-15 DIAGNOSIS — K21.9 GASTROESOPHAGEAL REFLUX DISEASE WITHOUT ESOPHAGITIS: ICD-10-CM

## 2018-02-15 PROCEDURE — 82310 ASSAY OF CALCIUM: CPT | Performed by: FAMILY MEDICINE

## 2018-02-15 PROCEDURE — 36415 COLL VENOUS BLD VENIPUNCTURE: CPT | Performed by: FAMILY MEDICINE

## 2018-02-15 PROCEDURE — 83970 ASSAY OF PARATHORMONE: CPT | Performed by: FAMILY MEDICINE

## 2018-02-15 PROCEDURE — 99213 OFFICE O/P EST LOW 20 MIN: CPT | Performed by: FAMILY MEDICINE

## 2018-02-15 RX ORDER — BUPROPION HYDROCHLORIDE 150 MG/1
150 TABLET ORAL EVERY MORNING
Qty: 30 TABLET | Refills: 3 | Status: SHIPPED | OUTPATIENT
Start: 2018-02-15 | End: 2018-10-18 | Stop reason: SDUPTHER

## 2018-02-15 RX ORDER — PANTOPRAZOLE SODIUM 40 MG/1
40 TABLET, DELAYED RELEASE ORAL DAILY
Qty: 30 TABLET | Refills: 6 | Status: SHIPPED | OUTPATIENT
Start: 2018-02-15 | End: 2018-10-09 | Stop reason: SDUPTHER

## 2018-02-15 RX ORDER — SERTRALINE HYDROCHLORIDE 25 MG/1
25 TABLET, FILM COATED ORAL DAILY
Qty: 30 TABLET | Refills: 3 | Status: SHIPPED | OUTPATIENT
Start: 2018-02-15 | End: 2018-06-03 | Stop reason: SDUPTHER

## 2018-02-15 NOTE — PROGRESS NOTES
Subjective   Nicol Humphrey is a 32 y.o. female.     HPI Comments: Patient reports that she was in the ER with dizziness and her 'head feeling funny'. Reports afterwards she realized that she was likely withdrawing from her antidepressants as she had run out and had not called her doctor about needing a refil. Reports issues getting her medications from her pharmacy.       The following portions of the patient's history were reviewed and updated as appropriate: allergies, current medications, past family history, past medical history, past social history, past surgical history and problem list.    Social History     Social History   • Marital status:      Spouse name: N/A   • Number of children: N/A   • Years of education: N/A     Occupational History   • Not on file.     Social History Main Topics   • Smoking status: Former Smoker     Quit date: 1/3/2018   • Smokeless tobacco: Not on file   • Alcohol use No   • Drug use: Not on file   • Sexual activity: Not on file     Other Topics Concern   • Not on file     Social History Narrative       Immunization History   Administered Date(s) Administered   • DTaP 05/08/1987, 04/13/1988, 05/31/1990, 08/22/1990, 08/08/1991   • Flu Vaccine Quad PF >18YRS 11/30/2016   • HiB 05/31/1990   • MMR 04/13/1988, 08/08/1991   • PPD Test 05/31/1990, 08/08/1991   • Pneumococcal Polysaccharide 11/30/2016   • Polio, Unspecified 05/08/1987, 04/13/1988, 05/31/1990, 08/08/1991        Current Outpatient Prescriptions on File Prior to Visit   Medication Sig Dispense Refill   • hydrOXYzine (ATARAX) 25 MG tablet Take 0.5 tablets by mouth At Night As Needed for Anxiety. 60 tablet 0   • [DISCONTINUED] buPROPion XL (WELLBUTRIN XL) 150 MG 24 hr tablet TAKE 1 TABLET BY MOUTH DAILY. 30 tablet 3   • [DISCONTINUED] pantoprazole (PROTONIX) 40 MG EC tablet TAKE 1 TABLET BY MOUTH DAILY. 30 tablet 2   • [DISCONTINUED] sertraline (ZOLOFT) 50 MG tablet TAKE 1 TABLET BY MOUTH DAILY. 30 tablet 2   •  azithromycin (ZITHROMAX Z-ADEN) 250 MG tablet Take 2 tablets the first day, then 1 tablet daily for 4 days. 6 tablet 0   • MethylPREDNISolone (MEDROL, ADEN,) 4 MG tablet Take as directed on package instructions. 21 tablet 0   • naproxen (EC NAPROSYN) 500 MG EC tablet Take 1 tablet by mouth 2 (Two) Times a Day As Needed for Mild Pain . 30 tablet 0   • nicotine (EQL NICOTINE) 21 MG/24HR patch Place 1 patch on the skin Daily. 30 patch 2     No current facility-administered medications on file prior to visit.        Review of Systems   Constitutional: Negative for activity change, appetite change, fatigue and fever.   HENT: Negative for ear pain and sore throat.    Eyes: Negative for pain and visual disturbance.   Respiratory: Negative for cough and shortness of breath.    Cardiovascular: Negative for chest pain and palpitations.   Gastrointestinal: Negative for abdominal pain and nausea.   Endocrine: Negative for cold intolerance and heat intolerance.   Genitourinary: Negative for difficulty urinating and dysuria.   Musculoskeletal: Negative for arthralgias and gait problem.   Skin: Negative for color change and rash.   Neurological: Positive for dizziness. Negative for weakness and headaches.   Hematological: Negative for adenopathy. Does not bruise/bleed easily.   Psychiatric/Behavioral: Positive for sleep disturbance. Negative for agitation and confusion.       Objective   Physical Exam   Constitutional: She is oriented to person, place, and time. She appears well-developed and well-nourished.   HENT:   Head: Normocephalic and atraumatic.   Eyes: Conjunctivae, EOM and lids are normal. Pupils are equal, round, and reactive to light.   Neck: Normal range of motion. Neck supple.   Cardiovascular: Normal rate, regular rhythm and normal heart sounds.  Exam reveals no gallop and no friction rub.    No murmur heard.  Pulmonary/Chest: Effort normal and breath sounds normal.   Abdominal: Soft. Normal appearance and bowel  sounds are normal. There is no tenderness.   Musculoskeletal: Normal range of motion.   Neurological: She is alert and oriented to person, place, and time.   Skin: Skin is warm, dry and intact.   Psychiatric: She has a normal mood and affect. Her speech is normal and behavior is normal. Judgment and thought content normal. Cognition and memory are normal.       Lab Results (most recent)     None          Vitals:    02/15/18 1438   BP: 125/69   Pulse: 85   SpO2: 98%       Assessment/Plan   Nicol was seen today for med refill.    Diagnoses and all orders for this visit:    Generalized anxiety disorder  -     buPROPion XL (WELLBUTRIN XL) 150 MG 24 hr tablet; Take 1 tablet by mouth Every Morning.  -     sertraline (ZOLOFT) 50 MG tablet; Take 1 tablet by mouth Daily.  -     sertraline (ZOLOFT) 25 MG tablet; Take 1 tablet by mouth Daily.    Depression, unspecified depression type  -     buPROPion XL (WELLBUTRIN XL) 150 MG 24 hr tablet; Take 1 tablet by mouth Every Morning.  -     sertraline (ZOLOFT) 50 MG tablet; Take 1 tablet by mouth Daily.  -     sertraline (ZOLOFT) 25 MG tablet; Take 1 tablet by mouth Daily.    Hypercalcemia  -     PTH, Intact & Calcium    Gastroesophageal reflux disease without esophagitis  -     pantoprazole (PROTONIX) 40 MG EC tablet; Take 1 tablet by mouth Daily.                   GOALS:  Goals        Patient Stated    • Less sadness/anxiety (pt-stated)          BARRIERS TO GOALS:  Patient reports issues getting her medications from pharmacy     Preventative:  Female Preventative: PAP is due.  up to date and documented   Recommended:Td and Influenza  former smoker  does not drink  eat more fruits and vegetables, decrease soda or juice intake, increase water intake, increase physical activity, reduce screen time and reduce portion size    RISK SCORE: 4        This document has been electronically signed by Shayna Uriarte MD on February 15, 2018 3:41 PM

## 2018-02-16 ENCOUNTER — TELEPHONE (OUTPATIENT)
Dept: FAMILY MEDICINE CLINIC | Facility: CLINIC | Age: 33
End: 2018-02-16

## 2018-02-16 DIAGNOSIS — E34.9 ELEVATED PARATHYROID HORMONE: Primary | ICD-10-CM

## 2018-02-16 LAB
CALCIUM SPEC-SCNC: 10.9 MG/DL (ref 8.4–10.2)
PTH-INTACT SERPL-MCNC: 198 PG/ML (ref 10–65)

## 2018-02-16 NOTE — TELEPHONE ENCOUNTER
-called the patient and did not get an answer, left a message to call me back  -patient has elevated PTH, ordered a nuclear scan of her neck to check for adenoma

## 2018-02-16 NOTE — TELEPHONE ENCOUNTER
Pt called said that she was seen and had some lab work done.   She is wanting to get the results from the labs.   Please call pt as soon as can.  Can reach pt at [phone]    Thanks

## 2018-02-19 ENCOUNTER — TELEPHONE (OUTPATIENT)
Dept: FAMILY MEDICINE CLINIC | Facility: CLINIC | Age: 33
End: 2018-02-19

## 2018-02-22 ENCOUNTER — APPOINTMENT (OUTPATIENT)
Dept: GENERAL RADIOLOGY | Facility: HOSPITAL | Age: 33
End: 2018-02-22

## 2018-02-22 ENCOUNTER — HOSPITAL ENCOUNTER (EMERGENCY)
Facility: HOSPITAL | Age: 33
Discharge: HOME | End: 2018-02-22
Attending: EMERGENCY MEDICINE | Admitting: EMERGENCY MEDICINE

## 2018-02-22 VITALS
DIASTOLIC BLOOD PRESSURE: 59 MMHG | HEIGHT: 68 IN | RESPIRATION RATE: 18 BRPM | OXYGEN SATURATION: 96 % | HEART RATE: 76 BPM | SYSTOLIC BLOOD PRESSURE: 124 MMHG | BODY MASS INDEX: 41.68 KG/M2 | WEIGHT: 275 LBS | TEMPERATURE: 98.3 F

## 2018-02-22 DIAGNOSIS — K52.9 GASTROENTERITIS: Primary | ICD-10-CM

## 2018-02-22 DIAGNOSIS — R07.89 NON-CARDIAC CHEST PAIN: ICD-10-CM

## 2018-02-22 LAB
ALBUMIN SERPL-MCNC: 4 G/DL (ref 3.4–4.8)
ALBUMIN/GLOB SERPL: 1.4 G/DL (ref 1.1–1.8)
ALP SERPL-CCNC: 83 U/L (ref 38–126)
ALT SERPL W P-5'-P-CCNC: 37 U/L (ref 9–52)
ANION GAP SERPL CALCULATED.3IONS-SCNC: 11 MMOL/L (ref 5–15)
AST SERPL-CCNC: 29 U/L (ref 14–36)
BASOPHILS # BLD AUTO: 0.01 10*3/MM3 (ref 0–0.2)
BASOPHILS NFR BLD AUTO: 0.2 % (ref 0–2)
BILIRUB SERPL-MCNC: 0.5 MG/DL (ref 0.2–1.3)
BUN BLD-MCNC: 15 MG/DL (ref 7–21)
BUN/CREAT SERPL: 21.4 (ref 7–25)
CALCIUM SPEC-SCNC: 9.8 MG/DL (ref 8.4–10.2)
CHLORIDE SERPL-SCNC: 102 MMOL/L (ref 95–110)
CO2 SERPL-SCNC: 23 MMOL/L (ref 22–31)
CREAT BLD-MCNC: 0.7 MG/DL (ref 0.5–1)
DEPRECATED RDW RBC AUTO: 42.7 FL (ref 36.4–46.3)
EOSINOPHIL # BLD AUTO: 0.08 10*3/MM3 (ref 0–0.7)
EOSINOPHIL NFR BLD AUTO: 1.3 % (ref 0–7)
ERYTHROCYTE [DISTWIDTH] IN BLOOD BY AUTOMATED COUNT: 12.4 % (ref 11.5–14.5)
GFR SERPL CREATININE-BSD FRML MDRD: 98 ML/MIN/1.73 (ref 64–149)
GLOBULIN UR ELPH-MCNC: 2.9 GM/DL (ref 2.3–3.5)
GLUCOSE BLD-MCNC: 94 MG/DL (ref 60–100)
HCG SERPL QL: NEGATIVE
HCT VFR BLD AUTO: 38.8 % (ref 35–45)
HGB BLD-MCNC: 13.4 G/DL (ref 12–15.5)
IMM GRANULOCYTES # BLD: 0 10*3/MM3 (ref 0–0.02)
IMM GRANULOCYTES NFR BLD: 0 % (ref 0–0.5)
LIPASE SERPL-CCNC: 52 U/L (ref 23–300)
LYMPHOCYTES # BLD AUTO: 1.14 10*3/MM3 (ref 0.6–4.2)
LYMPHOCYTES NFR BLD AUTO: 18.1 % (ref 10–50)
MCH RBC QN AUTO: 32.5 PG (ref 26.5–34)
MCHC RBC AUTO-ENTMCNC: 34.5 G/DL (ref 31.4–36)
MCV RBC AUTO: 94.2 FL (ref 80–98)
MONOCYTES # BLD AUTO: 0.38 10*3/MM3 (ref 0–0.9)
MONOCYTES NFR BLD AUTO: 6 % (ref 0–12)
NEUTROPHILS # BLD AUTO: 4.69 10*3/MM3 (ref 2–8.6)
NEUTROPHILS NFR BLD AUTO: 74.4 % (ref 37–80)
PLATELET # BLD AUTO: 306 10*3/MM3 (ref 150–450)
PMV BLD AUTO: 8.8 FL (ref 8–12)
POTASSIUM BLD-SCNC: 3.9 MMOL/L (ref 3.5–5.1)
PROT SERPL-MCNC: 6.9 G/DL (ref 6.3–8.6)
RBC # BLD AUTO: 4.12 10*6/MM3 (ref 3.77–5.16)
SODIUM BLD-SCNC: 136 MMOL/L (ref 137–145)
WBC NRBC COR # BLD: 6.3 10*3/MM3 (ref 3.2–9.8)

## 2018-02-22 PROCEDURE — 96374 THER/PROPH/DIAG INJ IV PUSH: CPT

## 2018-02-22 PROCEDURE — 96361 HYDRATE IV INFUSION ADD-ON: CPT

## 2018-02-22 PROCEDURE — 99284 EMERGENCY DEPT VISIT MOD MDM: CPT

## 2018-02-22 PROCEDURE — 25010000002 ONDANSETRON PER 1 MG: Performed by: EMERGENCY MEDICINE

## 2018-02-22 PROCEDURE — 25010000002 DIPHENHYDRAMINE PER 50 MG: Performed by: EMERGENCY MEDICINE

## 2018-02-22 PROCEDURE — 93005 ELECTROCARDIOGRAM TRACING: CPT | Performed by: EMERGENCY MEDICINE

## 2018-02-22 PROCEDURE — 71046 X-RAY EXAM CHEST 2 VIEWS: CPT

## 2018-02-22 PROCEDURE — 83690 ASSAY OF LIPASE: CPT | Performed by: EMERGENCY MEDICINE

## 2018-02-22 PROCEDURE — 25010000002 METOCLOPRAMIDE PER 10 MG: Performed by: EMERGENCY MEDICINE

## 2018-02-22 PROCEDURE — 85025 COMPLETE CBC W/AUTO DIFF WBC: CPT | Performed by: EMERGENCY MEDICINE

## 2018-02-22 PROCEDURE — 80053 COMPREHEN METABOLIC PANEL: CPT | Performed by: EMERGENCY MEDICINE

## 2018-02-22 PROCEDURE — 93010 ELECTROCARDIOGRAM REPORT: CPT | Performed by: INTERNAL MEDICINE

## 2018-02-22 PROCEDURE — 84703 CHORIONIC GONADOTROPIN ASSAY: CPT | Performed by: EMERGENCY MEDICINE

## 2018-02-22 PROCEDURE — 96375 TX/PRO/DX INJ NEW DRUG ADDON: CPT

## 2018-02-22 PROCEDURE — 25010000002 MORPHINE PER 10 MG: Performed by: EMERGENCY MEDICINE

## 2018-02-22 RX ORDER — ONDANSETRON 4 MG/1
4 TABLET, ORALLY DISINTEGRATING ORAL 4 TIMES DAILY
Qty: 15 TABLET | Refills: 0 | Status: SHIPPED | OUTPATIENT
Start: 2018-02-22 | End: 2018-04-09

## 2018-02-22 RX ORDER — ONDANSETRON 2 MG/ML
4 INJECTION INTRAMUSCULAR; INTRAVENOUS ONCE
Status: COMPLETED | OUTPATIENT
Start: 2018-02-22 | End: 2018-02-22

## 2018-02-22 RX ORDER — PANTOPRAZOLE SODIUM 40 MG/1
40 TABLET, DELAYED RELEASE ORAL 2 TIMES DAILY
Qty: 10 TABLET | Refills: 0 | Status: SHIPPED | OUTPATIENT
Start: 2018-02-22 | End: 2018-02-27

## 2018-02-22 RX ORDER — ALUMINA, MAGNESIA, AND SIMETHICONE 2400; 2400; 240 MG/30ML; MG/30ML; MG/30ML
15 SUSPENSION ORAL ONCE
Status: COMPLETED | OUTPATIENT
Start: 2018-02-22 | End: 2018-02-22

## 2018-02-22 RX ORDER — MORPHINE SULFATE 2 MG/ML
2 INJECTION, SOLUTION INTRAMUSCULAR; INTRAVENOUS ONCE
Status: COMPLETED | OUTPATIENT
Start: 2018-02-22 | End: 2018-02-22

## 2018-02-22 RX ORDER — DIPHENHYDRAMINE HYDROCHLORIDE 50 MG/ML
25 INJECTION INTRAMUSCULAR; INTRAVENOUS EVERY 6 HOURS PRN
Status: DISCONTINUED | OUTPATIENT
Start: 2018-02-22 | End: 2018-02-23 | Stop reason: HOSPADM

## 2018-02-22 RX ORDER — METOCLOPRAMIDE HYDROCHLORIDE 5 MG/ML
10 INJECTION INTRAMUSCULAR; INTRAVENOUS
Status: DISCONTINUED | OUTPATIENT
Start: 2018-02-22 | End: 2018-02-23 | Stop reason: HOSPADM

## 2018-02-22 RX ADMIN — ONDANSETRON 4 MG: 2 INJECTION INTRAMUSCULAR; INTRAVENOUS at 19:58

## 2018-02-22 RX ADMIN — LIDOCAINE HYDROCHLORIDE 15 ML: 20 SOLUTION ORAL; TOPICAL at 21:53

## 2018-02-22 RX ADMIN — SODIUM CHLORIDE 1000 ML: 900 INJECTION, SOLUTION INTRAVENOUS at 19:58

## 2018-02-22 RX ADMIN — ALUMINUM HYDROXIDE, MAGNESIUM HYDROXIDE, AND DIMETHICONE 15 ML: 400; 400; 40 SUSPENSION ORAL at 21:53

## 2018-02-22 RX ADMIN — METOCLOPRAMIDE 10 MG: 5 INJECTION, SOLUTION INTRAMUSCULAR; INTRAVENOUS at 20:34

## 2018-02-22 RX ADMIN — DIPHENHYDRAMINE HYDROCHLORIDE 25 MG: 50 INJECTION INTRAMUSCULAR; INTRAVENOUS at 20:34

## 2018-02-22 RX ADMIN — Medication 2 MG: at 19:58

## 2018-02-23 ENCOUNTER — TELEPHONE (OUTPATIENT)
Dept: FAMILY MEDICINE CLINIC | Facility: CLINIC | Age: 33
End: 2018-02-23

## 2018-02-23 NOTE — ED PROVIDER NOTES
Subjective   HPI Comments: 32-year-old female with no significant past medical history comes the emergency department after having a sudden onset of vomiting and diarrhea today.  She been vomiting multiple times and is now having chest pain as well.  There is no contacts or travel.  Nothing seems to make the symptoms better or worse.    Patient is a 32 y.o. female presenting with vomiting.   History provided by:  Patient  Vomiting   The primary symptoms include nausea, vomiting and diarrhea. Primary symptoms do not include fever, fatigue, abdominal pain, dysuria or rash. The illness began today. The onset was sudden.   The illness does not include chills or back pain. Associated symptoms comments: Chest pain  .       Review of Systems   Constitutional: Negative for chills, fatigue and fever.   HENT: Negative for congestion and sore throat.    Eyes: Negative for visual disturbance.   Respiratory: Negative for cough and shortness of breath.    Cardiovascular: Negative for chest pain and leg swelling.   Gastrointestinal: Positive for diarrhea, nausea and vomiting. Negative for abdominal pain.   Genitourinary: Negative for dysuria.   Musculoskeletal: Negative for back pain.   Skin: Negative for rash.   Neurological: Negative for dizziness and weakness.   Psychiatric/Behavioral: Negative for behavioral problems.   All other systems reviewed and are negative.      Past Medical History:   Diagnosis Date   • Anxiety disorder     Anxiety disorder, unspecified      • Depression    • Encounter for  visit      visit status      • Pediculosis due to pediculus humanus capitis    • Rectocele complicating  care, baby not yet delivered     Routine  care      • Supervision of other normal pregnancy    • Surgical follow-up care    • Transient hypertension of pregnancy     Transient hypertension of pregnancy - not delivered      • Upper respiratory infection    • Viral gastroenteritis        No Known  Allergies    Past Surgical History:   Procedure Laterality Date   •  SECTION      Repeat low transverse  with left partial salpingectomy. 2014       • CHOLECYSTECTOMY     • ECTOPIC PREGNANCY       Operative laparoscopy, right salpinectomy and removal of ectopic pregnancy. Right ectopic pregnancy. 2008       • INJECTION OF MEDICATION      B12 (1)      2010       • PAP SMEAR      Negative for intraepithelial lesion or malignancy. 2009       • TONSILLECTOMY     • TONSILLECTOMY AND ADENOIDECTOMY      Tonsillectomy and adenoidectomy. Chronic tonsillitis. 2000       • TUBAL ABDOMINAL LIGATION         Family History   Problem Relation Age of Onset   • Diabetes Other    • Thyroid disease Other      Grandfather and aunt        Social History     Social History   • Marital status:      Spouse name: N/A   • Number of children: N/A   • Years of education: N/A     Social History Main Topics   • Smoking status: Former Smoker     Quit date: 1/3/2018   • Smokeless tobacco: None   • Alcohol use No   • Drug use: No   • Sexual activity: Yes     Other Topics Concern   • None     Social History Narrative   • None           Objective   Physical Exam   Constitutional: She is oriented to person, place, and time. She appears well-developed and well-nourished.   HENT:   Head: Normocephalic and atraumatic.   Eyes: EOM are normal. Pupils are equal, round, and reactive to light.   Neck: Normal range of motion. Neck supple.   No midline tenderness   Cardiovascular: Normal rate, regular rhythm, normal heart sounds and intact distal pulses.  Exam reveals no gallop and no friction rub.    No murmur heard.  Pulmonary/Chest: Breath sounds normal. No respiratory distress. She has no wheezes. She has no rales.   No rhonchi   Abdominal: Soft. Bowel sounds are normal. She exhibits no distension. There is no tenderness.   Musculoskeletal: Normal range of motion. She exhibits no tenderness or deformity.    Neurological: She is alert and oriented to person, place, and time. No cranial nerve deficit. She exhibits normal muscle tone. Coordination normal.   Skin: Skin is warm and dry. No rash noted.   Psychiatric: She has a normal mood and affect. Her behavior is normal.   Vitals reviewed.      ECG 12 Lead    Date/Time: 2/22/2018 9:39 PM  Performed by: ROBERTO HARDING  Authorized by: ROBERTO HARDING                  ED Course  ED Course   10:04 PM patient is improved.  Pain has resolved.  The chest pain is likely due to her vomiting.  She'll be discharged home with antiemetic medication as well as a PPI.  She was instructed to follow-up with her primary care doctor in 48 hours.  Risk possibility of a Beverly-Dai tear.              MDM  Number of Diagnoses or Management Options     Amount and/or Complexity of Data Reviewed  Clinical lab tests: ordered and reviewed  Tests in the radiology section of CPT®: ordered and reviewed  Tests in the medicine section of CPT®: ordered  Obtain history from someone other than the patient: yes        Final diagnoses:   Gastroenteritis   Non-cardiac chest pain          Roberto Harding MD  02/23/18 1640

## 2018-02-27 ENCOUNTER — TELEPHONE (OUTPATIENT)
Dept: FAMILY MEDICINE CLINIC | Facility: CLINIC | Age: 33
End: 2018-02-27

## 2018-02-28 ENCOUNTER — HOSPITAL ENCOUNTER (OUTPATIENT)
Dept: NUCLEAR MEDICINE | Facility: HOSPITAL | Age: 33
Discharge: HOME | End: 2018-02-28

## 2018-02-28 PROCEDURE — 0 TECHNETIUM SESTAMIBI: Performed by: FAMILY MEDICINE

## 2018-02-28 PROCEDURE — 78070 PARATHYROID PLANAR IMAGING: CPT

## 2018-02-28 PROCEDURE — A9500 TC99M SESTAMIBI: HCPCS | Performed by: FAMILY MEDICINE

## 2018-02-28 RX ORDER — HYDROXYZINE HYDROCHLORIDE 25 MG/1
12.5 TABLET, FILM COATED ORAL NIGHTLY PRN
Qty: 60 TABLET | Refills: 1 | Status: SHIPPED | OUTPATIENT
Start: 2018-02-28 | End: 2018-05-05 | Stop reason: SDUPTHER

## 2018-02-28 RX ADMIN — TECHNETIUM TC 99M SESTAMIBI 1 DOSE: 1 INJECTION INTRAVENOUS at 12:19

## 2018-03-02 ENCOUNTER — TELEPHONE (OUTPATIENT)
Dept: FAMILY MEDICINE CLINIC | Facility: CLINIC | Age: 33
End: 2018-03-02

## 2018-04-09 ENCOUNTER — OFFICE VISIT (OUTPATIENT)
Dept: OBSTETRICS AND GYNECOLOGY | Facility: CLINIC | Age: 33
End: 2018-04-09

## 2018-04-09 VITALS
BODY MASS INDEX: 42.74 KG/M2 | WEIGHT: 282 LBS | SYSTOLIC BLOOD PRESSURE: 124 MMHG | DIASTOLIC BLOOD PRESSURE: 76 MMHG | HEIGHT: 68 IN

## 2018-04-09 DIAGNOSIS — R10.2 PELVIC PAIN: ICD-10-CM

## 2018-04-09 DIAGNOSIS — Z01.419 WELL FEMALE EXAM WITH ROUTINE GYNECOLOGICAL EXAM: Primary | ICD-10-CM

## 2018-04-09 PROCEDURE — 99385 PREV VISIT NEW AGE 18-39: CPT | Performed by: OBSTETRICS & GYNECOLOGY

## 2018-04-09 PROCEDURE — G0123 SCREEN CERV/VAG THIN LAYER: HCPCS | Performed by: OBSTETRICS & GYNECOLOGY

## 2018-04-09 PROCEDURE — 87624 HPV HI-RISK TYP POOLED RSLT: CPT | Performed by: OBSTETRICS & GYNECOLOGY

## 2018-04-09 RX ORDER — PHENTERMINE HYDROCHLORIDE 30 MG/1
30 CAPSULE ORAL EVERY MORNING
COMMUNITY
End: 2018-11-19

## 2018-04-09 NOTE — PROGRESS NOTES
Subjective     Chief Complaint   Patient presents with   • Gynecologic Exam       Nicol Humphrey is a 32 y.o.  presents today for annual exam.  Still having regular periods, no issues with cycles.  Had tubal in .  No discharge but does have right sided pelvic pain.  States it started several months ago, pain is a 6/10 on pain scale and is pretty much constant.  Nothing makes it better and nothing makes it worse.  No association with urination, bowel movements, cycles, or with diet.  Has not tried any medications to alleviate it.  States she just deals with it.      Last pap was about 13-14 years ago, no history of abnl paps or STDs.      Past Medical History:   Diagnosis Date   • Anxiety disorder     Anxiety disorder, unspecified      • Depression    • Encounter for  visit      visit status      • Pediculosis due to pediculus humanus capitis    • Rectocele complicating  care, baby not yet delivered     Routine  care      • Supervision of other normal pregnancy    • Surgical follow-up care    • Transient hypertension of pregnancy     Transient hypertension of pregnancy - not delivered      • Upper respiratory infection    • Viral gastroenteritis      Past Surgical History:   Procedure Laterality Date   •  SECTION      Repeat low transverse  with left partial salpingectomy. 2014       • CHOLECYSTECTOMY     • ECTOPIC PREGNANCY       Operative laparoscopy, right salpinectomy and removal of ectopic pregnancy. Right ectopic pregnancy. 2008       • INJECTION OF MEDICATION      B12 (1)      2010       • PAP SMEAR      Negative for intraepithelial lesion or malignancy. 2009       • TONSILLECTOMY     • TONSILLECTOMY AND ADENOIDECTOMY      Tonsillectomy and adenoidectomy. Chronic tonsillitis. 2000       • TUBAL ABDOMINAL LIGATION       Social History     Social History   • Marital status:      Spouse name: N/A   • Number of  "children: N/A   • Years of education: N/A     Occupational History   • Not on file.     Social History Main Topics   • Smoking status: Former Smoker     Quit date: 1/3/2018   • Smokeless tobacco: Not on file   • Alcohol use No   • Drug use: No   • Sexual activity: Yes     Other Topics Concern   • Not on file     Social History Narrative   • No narrative on file     Family History   Problem Relation Age of Onset   • Diabetes Other    • Thyroid disease Other      Grandfather and aunt      Review of Systems - comprehensive ROS preformed and all negative except for fatigue, difficulty sleeping, anxiety, depression.      Objective      /76   Ht 172.7 cm (68\")   Wt 128 kg (282 lb)   LMP 03/13/2018   BMI 42.88 kg/m²     Physical Exam  General:   Appears stated age, AAOx3, NAD; multiple tattoos    Heart: RRR no m/r/g   Lungs: CTAB   Breast: Symmetrical, no masses or lumps noted exam bilaterally, no skin retraction or dimpling noted. No nipple discharge.    Neck: No neck fullness, thyroid normal with no nodules noted   Abdomen: Soft, nttp, no masses palpated   Pelvis: External genitalia - NEFG  Urethra - normal appearing, no urethra caruncle or prolapse  Vagina - normal appearing vagina, no discharge or bleeding noted, no lesions.   Cervix - Normal appearing cervix, pap preformed.   Uterus - Difficult to fully appreciate uterine size due to habitus, no CMT   Adenxa - no adnexal fullness or masses noted, however, slightly ttp in adnexal regions bilaterally.  No rebound or guarding  Anus - normal appearing, small external hemorrhoids   Extremities: No CT or edema bilaterally    Neurologic: CN II - XII grossly intact       Assessment/Plan     ASSESSMENT  1. Well female exam with routine gynecological exam    2. Pelvic pain        PLAN  1. Well woman exam  - Pap with [facility] today   - Reviewed self breast exams    2. Pelvic pain   - No pathology found on physical exam  - Will obtain imaging to ensure no pelvic pathology; " briefly discussed etiologies for pelvic pain.    - RTC after imaging to discuss more    Orders Placed This Encounter   Procedures   • US Non-ob Transvaginal           New Medications Ordered This Visit   Medications   • phentermine 30 MG capsule     Sig: Take 30 mg by mouth Every Morning.       Maria L CREWS Friday, MD  4/9/2018

## 2018-04-11 LAB
LAB AP CASE REPORT: NORMAL
LAB AP GYN ADDITIONAL INFORMATION: NORMAL
Lab: NORMAL
PATH INTERP SPEC-IMP: NORMAL
STAT OF ADQ CVX/VAG CYTO-IMP: NORMAL

## 2018-04-26 LAB — HPV I/H RISK 4 DNA CVX QL PROBE+SIG AMP: NEGATIVE

## 2018-05-03 RX ORDER — HYDROXYZINE HYDROCHLORIDE 25 MG/1
TABLET, FILM COATED ORAL
Qty: 60 TABLET | Refills: 0 | Status: SHIPPED | OUTPATIENT
Start: 2018-05-03 | End: 2019-02-11 | Stop reason: SDUPTHER

## 2018-05-15 RX ORDER — HYDROXYZINE HYDROCHLORIDE 25 MG/1
TABLET, FILM COATED ORAL
Qty: 60 TABLET | Refills: 1 | Status: SHIPPED | OUTPATIENT
Start: 2018-05-15 | End: 2018-11-19

## 2018-06-03 DIAGNOSIS — F32.A DEPRESSION, UNSPECIFIED DEPRESSION TYPE: ICD-10-CM

## 2018-06-03 DIAGNOSIS — F41.1 GENERALIZED ANXIETY DISORDER: ICD-10-CM

## 2018-06-07 RX ORDER — SERTRALINE HYDROCHLORIDE 25 MG/1
25 TABLET, FILM COATED ORAL DAILY
Qty: 30 TABLET | Refills: 3 | Status: SHIPPED | OUTPATIENT
Start: 2018-06-07 | End: 2018-11-03 | Stop reason: SDUPTHER

## 2018-07-02 DIAGNOSIS — F41.1 GENERALIZED ANXIETY DISORDER: ICD-10-CM

## 2018-07-02 DIAGNOSIS — F32.A DEPRESSION, UNSPECIFIED DEPRESSION TYPE: ICD-10-CM

## 2018-10-09 DIAGNOSIS — K21.9 GASTROESOPHAGEAL REFLUX DISEASE WITHOUT ESOPHAGITIS: ICD-10-CM

## 2018-10-09 RX ORDER — PANTOPRAZOLE SODIUM 40 MG/1
40 TABLET, DELAYED RELEASE ORAL DAILY
Qty: 30 TABLET | Refills: 6 | Status: SHIPPED | OUTPATIENT
Start: 2018-10-09 | End: 2019-05-04 | Stop reason: SDUPTHER

## 2018-10-18 DIAGNOSIS — F41.1 GENERALIZED ANXIETY DISORDER: ICD-10-CM

## 2018-10-18 DIAGNOSIS — F32.A DEPRESSION, UNSPECIFIED DEPRESSION TYPE: ICD-10-CM

## 2018-10-18 RX ORDER — BUPROPION HYDROCHLORIDE 150 MG/1
TABLET ORAL
Qty: 30 TABLET | Refills: 2 | Status: SHIPPED | OUTPATIENT
Start: 2018-10-18 | End: 2019-01-07 | Stop reason: SDUPTHER

## 2018-11-03 DIAGNOSIS — F41.1 GENERALIZED ANXIETY DISORDER: ICD-10-CM

## 2018-11-03 DIAGNOSIS — F32.A DEPRESSION, UNSPECIFIED DEPRESSION TYPE: ICD-10-CM

## 2018-11-05 RX ORDER — SERTRALINE HYDROCHLORIDE 25 MG/1
TABLET, FILM COATED ORAL
Qty: 30 TABLET | Refills: 3 | Status: SHIPPED | OUTPATIENT
Start: 2018-11-05 | End: 2019-01-14 | Stop reason: DRUGHIGH

## 2018-12-08 DIAGNOSIS — F32.A DEPRESSION, UNSPECIFIED DEPRESSION TYPE: ICD-10-CM

## 2018-12-08 DIAGNOSIS — F41.1 GENERALIZED ANXIETY DISORDER: ICD-10-CM

## 2019-01-07 DIAGNOSIS — F32.A DEPRESSION, UNSPECIFIED DEPRESSION TYPE: ICD-10-CM

## 2019-01-07 DIAGNOSIS — F41.1 GENERALIZED ANXIETY DISORDER: ICD-10-CM

## 2019-01-07 RX ORDER — BUPROPION HYDROCHLORIDE 150 MG/1
TABLET ORAL
Qty: 30 TABLET | Refills: 2 | Status: SHIPPED | OUTPATIENT
Start: 2019-01-07 | End: 2019-01-14

## 2019-01-14 ENCOUNTER — LAB (OUTPATIENT)
Dept: LAB | Facility: HOSPITAL | Age: 34
End: 2019-01-14

## 2019-01-14 ENCOUNTER — OFFICE VISIT (OUTPATIENT)
Dept: FAMILY MEDICINE CLINIC | Facility: CLINIC | Age: 34
End: 2019-01-14

## 2019-01-14 VITALS
SYSTOLIC BLOOD PRESSURE: 118 MMHG | RESPIRATION RATE: 20 BRPM | HEIGHT: 62 IN | BODY MASS INDEX: 44.87 KG/M2 | TEMPERATURE: 98 F | WEIGHT: 243.8 LBS | HEART RATE: 74 BPM | OXYGEN SATURATION: 99 % | DIASTOLIC BLOOD PRESSURE: 84 MMHG

## 2019-01-14 DIAGNOSIS — Z76.89 ENCOUNTER TO ESTABLISH CARE: Primary | ICD-10-CM

## 2019-01-14 DIAGNOSIS — F41.8 ANXIETY WITH DEPRESSION: ICD-10-CM

## 2019-01-14 DIAGNOSIS — Z00.00 ENCOUNTER FOR SCREENING AND PREVENTATIVE CARE: ICD-10-CM

## 2019-01-14 LAB
ALBUMIN SERPL-MCNC: 4.4 G/DL (ref 3.4–4.8)
ALBUMIN/GLOB SERPL: 1.8 G/DL (ref 1.1–1.8)
ALP SERPL-CCNC: 71 U/L (ref 38–126)
ALT SERPL W P-5'-P-CCNC: 16 U/L (ref 9–52)
ANION GAP SERPL CALCULATED.3IONS-SCNC: 4 MMOL/L (ref 5–15)
AST SERPL-CCNC: 26 U/L (ref 14–36)
BASOPHILS # BLD AUTO: 0.02 10*3/MM3 (ref 0–0.2)
BASOPHILS NFR BLD AUTO: 0.3 % (ref 0–2)
BILIRUB SERPL-MCNC: 0.2 MG/DL (ref 0.2–1.3)
BUN BLD-MCNC: 8 MG/DL (ref 7–21)
BUN/CREAT SERPL: 11.9 (ref 7–25)
CALCIUM SPEC-SCNC: 10.7 MG/DL (ref 8.4–10.2)
CHLORIDE SERPL-SCNC: 105 MMOL/L (ref 95–110)
CO2 SERPL-SCNC: 27 MMOL/L (ref 22–31)
CREAT BLD-MCNC: 0.67 MG/DL (ref 0.5–1)
DEPRECATED RDW RBC AUTO: 44.1 FL (ref 36.4–46.3)
EOSINOPHIL # BLD AUTO: 0.09 10*3/MM3 (ref 0–0.7)
EOSINOPHIL NFR BLD AUTO: 1.3 % (ref 0–7)
ERYTHROCYTE [DISTWIDTH] IN BLOOD BY AUTOMATED COUNT: 12.6 % (ref 11.5–14.5)
GFR SERPL CREATININE-BSD FRML MDRD: 102 ML/MIN/1.73 (ref 64–149)
GLOBULIN UR ELPH-MCNC: 2.4 GM/DL (ref 2.3–3.5)
GLUCOSE BLD-MCNC: 91 MG/DL (ref 60–100)
HCT VFR BLD AUTO: 38.6 % (ref 35–45)
HGB BLD-MCNC: 13.1 G/DL (ref 12–15.5)
IMM GRANULOCYTES # BLD AUTO: 0.02 10*3/MM3 (ref 0–0.02)
IMM GRANULOCYTES NFR BLD AUTO: 0.3 % (ref 0–0.5)
LYMPHOCYTES # BLD AUTO: 2.32 10*3/MM3 (ref 0.6–4.2)
LYMPHOCYTES NFR BLD AUTO: 32.4 % (ref 10–50)
MCH RBC QN AUTO: 32.3 PG (ref 26.5–34)
MCHC RBC AUTO-ENTMCNC: 33.9 G/DL (ref 31.4–36)
MCV RBC AUTO: 95.3 FL (ref 80–98)
MONOCYTES # BLD AUTO: 0.52 10*3/MM3 (ref 0–0.9)
MONOCYTES NFR BLD AUTO: 7.3 % (ref 0–12)
NEUTROPHILS # BLD AUTO: 4.19 10*3/MM3 (ref 2–8.6)
NEUTROPHILS NFR BLD AUTO: 58.4 % (ref 37–80)
PLATELET # BLD AUTO: 353 10*3/MM3 (ref 150–450)
PMV BLD AUTO: 9.6 FL (ref 8–12)
POTASSIUM BLD-SCNC: 4.2 MMOL/L (ref 3.5–5.1)
PROT SERPL-MCNC: 6.8 G/DL (ref 6.3–8.6)
RBC # BLD AUTO: 4.05 10*6/MM3 (ref 3.77–5.16)
SODIUM BLD-SCNC: 136 MMOL/L (ref 137–145)
TSH SERPL DL<=0.05 MIU/L-ACNC: 1.88 MIU/ML (ref 0.46–4.68)
WBC NRBC COR # BLD: 7.16 10*3/MM3 (ref 3.2–9.8)

## 2019-01-14 PROCEDURE — 99213 OFFICE O/P EST LOW 20 MIN: CPT | Performed by: NURSE PRACTITIONER

## 2019-01-14 PROCEDURE — 83036 HEMOGLOBIN GLYCOSYLATED A1C: CPT

## 2019-01-14 PROCEDURE — 80050 GENERAL HEALTH PANEL: CPT

## 2019-01-14 PROCEDURE — 36415 COLL VENOUS BLD VENIPUNCTURE: CPT

## 2019-01-14 RX ORDER — BUSPIRONE HYDROCHLORIDE 10 MG/1
10 TABLET ORAL 3 TIMES DAILY
Qty: 90 TABLET | Refills: 1 | Status: SHIPPED | OUTPATIENT
Start: 2019-01-14 | End: 2019-02-11 | Stop reason: SDUPTHER

## 2019-01-14 RX ORDER — SERTRALINE HYDROCHLORIDE 100 MG/1
100 TABLET, FILM COATED ORAL DAILY
Qty: 30 TABLET | Refills: 1 | Status: SHIPPED | OUTPATIENT
Start: 2019-01-14 | End: 2019-02-11

## 2019-01-14 NOTE — PROGRESS NOTES
Subjective   Nicol Humphrey is a 33 y.o. female.     Ms. Humphrey is a 33-year-old female presents today to establish care for the management of anxiety and depression. Patient currently takes Zoloft, will Wellbutrin and hydroxyzine daily for her anxiety and depression. While she feels her depression is mostly under control. She feels that she is very stressed out and anxious most the time. She states this is causing poor quality of sleep in a short temper. She is deficient seeking to change her current therapy. Denies suicidal or homicidal ideations. No other acute complaints today.         The following portions of the patient's history were reviewed and updated as appropriate: allergies, current medications, past family history, past medical history, past social history, past surgical history and problem list.    Review of Systems   Constitutional: Negative for activity change, appetite change, chills, diaphoresis, fatigue, fever, unexpected weight gain and unexpected weight loss.   HENT: Negative for congestion, sore throat, trouble swallowing and voice change.    Eyes: Negative for blurred vision, double vision, photophobia, pain and visual disturbance.   Respiratory: Negative for cough, chest tightness, shortness of breath and wheezing.    Cardiovascular: Negative for chest pain, palpitations and leg swelling.   Gastrointestinal: Negative for abdominal distention, abdominal pain, anal bleeding, blood in stool, constipation, diarrhea, nausea, vomiting, GERD and indigestion.   Endocrine: Negative for cold intolerance, heat intolerance, polydipsia, polyphagia and polyuria.   Genitourinary: Negative for dysuria, frequency, hematuria and urgency.   Musculoskeletal: Negative for arthralgias and myalgias.   Skin: Negative for rash.   Allergic/Immunologic: Negative.    Neurological: Negative for dizziness, syncope, weakness, light-headedness and headache.   Hematological: Negative.    Psychiatric/Behavioral: Positive  for sleep disturbance and depressed mood. Negative for suicidal ideas and stress. The patient is nervous/anxious.        Objective   Physical Exam   Constitutional: She is oriented to person, place, and time. She appears well-developed and well-nourished. No distress.   HENT:   Head: Normocephalic and atraumatic.   Right Ear: External ear normal.   Left Ear: External ear normal.   Nose: Nose normal.   Mouth/Throat: Oropharynx is clear and moist.   Eyes: Conjunctivae and EOM are normal. Pupils are equal, round, and reactive to light.   Neck: Normal range of motion. Neck supple. No tracheal deviation present. No thyromegaly present.   Cardiovascular: Normal rate, regular rhythm, normal heart sounds and intact distal pulses. Exam reveals no gallop and no friction rub.   No murmur heard.  Pulmonary/Chest: Effort normal and breath sounds normal. No stridor. No respiratory distress. She has no wheezes. She has no rales.   Abdominal: Soft. Bowel sounds are normal. She exhibits no distension and no mass. There is no tenderness. There is no rebound and no guarding. No hernia.   Musculoskeletal: Normal range of motion. She exhibits no edema or tenderness.   Lymphadenopathy:     She has no cervical adenopathy.   Neurological: She is alert and oriented to person, place, and time. No cranial nerve deficit. Coordination normal.   Skin: Skin is warm and dry. No rash noted. She is not diaphoretic. No erythema. No pallor.   Psychiatric: Her behavior is normal. Judgment and thought content normal. Her mood appears anxious. Her affect is not angry, not blunt, not labile and not inappropriate. She is not actively hallucinating. Thought content is not paranoid and not delusional. Cognition and memory are normal. She does not exhibit a depressed mood. She expresses no homicidal and no suicidal ideation. She expresses no suicidal plans and no homicidal plans. She is attentive.   Nursing note and vitals reviewed.        Assessment/Plan    Nicol was seen today for establish care.    Diagnoses and all orders for this visit:    Encounter to establish care    Encounter for screening and preventative care  -     Hemoglobin A1c; Future  -     Comprehensive Metabolic Panel; Future  -     CBC & Differential; Future  -     TSH; Future    Anxiety with depression    Other orders  -     sertraline (ZOLOFT) 100 MG tablet; Take 1 tablet by mouth Daily for 30 days.  -     busPIRone (BUSPAR) 10 MG tablet; Take 1 tablet by mouth 3 (Three) Times a Day for 30 days.    1. Anxiety with depression-discontinue Wellbutrin. Patient instructed to take every other day times 1 week and then stop Wellbutrin. Patient verbalized understanding of instruction. Increase Zoloft to 100 mg by mouth daily. Will add BuSpar 10 mg 1 tablet 3 times a day. Patient started to stop medication if symptoms worsen. Patient instructed to present to the ER for suicidal or homicidal ideations. Patient verbalized understanding of instruction.    2. Health maintenance-routine labs ordered. We'll call results.    3. Follow-up in one month or sooner if needed.            This document has been electronically signed by ARIANNA Lo on January 15, 2019 12:57 PM

## 2019-01-16 LAB — HBA1C MFR BLD: 5.1 % (ref 4–5.6)

## 2019-01-16 NOTE — PROGRESS NOTES
No concerning labs. Calcium slightly elevated. We'll recheck a follow-up. Follow-up in one month as scheduled.

## 2019-02-11 ENCOUNTER — TELEPHONE (OUTPATIENT)
Dept: FAMILY MEDICINE CLINIC | Facility: CLINIC | Age: 34
End: 2019-02-11

## 2019-02-11 ENCOUNTER — DOCUMENTATION (OUTPATIENT)
Dept: FAMILY MEDICINE CLINIC | Facility: CLINIC | Age: 34
End: 2019-02-11

## 2019-02-11 ENCOUNTER — OFFICE VISIT (OUTPATIENT)
Dept: FAMILY MEDICINE CLINIC | Facility: CLINIC | Age: 34
End: 2019-02-11

## 2019-02-11 VITALS
HEIGHT: 62 IN | SYSTOLIC BLOOD PRESSURE: 130 MMHG | DIASTOLIC BLOOD PRESSURE: 82 MMHG | BODY MASS INDEX: 43.54 KG/M2 | OXYGEN SATURATION: 98 % | WEIGHT: 236.6 LBS | HEART RATE: 88 BPM | RESPIRATION RATE: 20 BRPM

## 2019-02-11 DIAGNOSIS — F41.8 ANXIETY WITH DEPRESSION: Primary | ICD-10-CM

## 2019-02-11 DIAGNOSIS — E83.52 HYPERCALCEMIA: ICD-10-CM

## 2019-02-11 PROCEDURE — 99213 OFFICE O/P EST LOW 20 MIN: CPT | Performed by: NURSE PRACTITIONER

## 2019-02-11 RX ORDER — PHENTERMINE HYDROCHLORIDE 37.5 MG/1
37.5 TABLET ORAL
COMMUNITY
End: 2019-04-17

## 2019-02-11 RX ORDER — HYDROXYZINE HYDROCHLORIDE 25 MG/1
25 TABLET, FILM COATED ORAL NIGHTLY PRN
Qty: 30 TABLET | Refills: 3 | Status: SHIPPED | OUTPATIENT
Start: 2019-02-11 | End: 2019-03-13

## 2019-02-11 RX ORDER — BUSPIRONE HYDROCHLORIDE 10 MG/1
10 TABLET ORAL 3 TIMES DAILY
Qty: 90 TABLET | Refills: 1 | Status: SHIPPED | OUTPATIENT
Start: 2019-02-11 | End: 2019-04-06 | Stop reason: SDUPTHER

## 2019-02-11 RX ORDER — SERTRALINE HYDROCHLORIDE 100 MG/1
100 TABLET, FILM COATED ORAL DAILY
Qty: 30 TABLET | Refills: 2 | Status: SHIPPED | OUTPATIENT
Start: 2019-02-11 | End: 2019-06-01 | Stop reason: SDUPTHER

## 2019-02-11 NOTE — PROGRESS NOTES
Subjective   Nicol Humphrey is a 33 y.o. female.     Ms. Humphrey is a 33-year-old female presents today for one-month follow-up related to anxiety and depression.  Last visit she was started on Zoloft 100 mg p.o. daily and BuSpar 10 mg p.o. 3 times daily.  Patient is tolerating medication well with no ill side effects.  She reports an improvement in her anxiety and depression symptoms.  Denies suicidal or homicidal ideations.         The following portions of the patient's history were reviewed and updated as appropriate: allergies, current medications, past family history, past medical history, past social history, past surgical history and problem list.    Review of Systems   Constitutional: Negative for activity change, appetite change, fatigue, unexpected weight gain and unexpected weight loss.   HENT: Negative for congestion, sore throat, trouble swallowing and voice change.    Eyes: Negative.    Respiratory: Negative for cough, chest tightness, shortness of breath and wheezing.    Cardiovascular: Negative for chest pain, palpitations and leg swelling.   Gastrointestinal: Negative for abdominal pain, constipation, diarrhea, nausea and vomiting.   Endocrine: Negative.    Genitourinary: Negative for dysuria.   Musculoskeletal: Negative for arthralgias and myalgias.   Skin: Negative for rash.   Allergic/Immunologic: Negative.    Neurological: Negative.    Hematological: Negative.         Reported improvement in anxiety and depression symptoms.  Denies suicidal or homicidal ideations.   Psychiatric/Behavioral: Positive for depressed mood. Negative for suicidal ideas. The patient is nervous/anxious.        Objective   Physical Exam   Constitutional: She is oriented to person, place, and time. She appears well-developed and well-nourished. No distress.   HENT:   Head: Normocephalic and atraumatic.   Eyes: Conjunctivae are normal.   Neck: Normal range of motion.   Cardiovascular: Normal rate, regular rhythm and normal  heart sounds.   Pulmonary/Chest: Effort normal and breath sounds normal. No respiratory distress. She has no wheezes. She has no rales.   Musculoskeletal: Normal range of motion. She exhibits no edema or tenderness.   Neurological: She is alert and oriented to person, place, and time.   Skin: Skin is warm and dry. No rash noted. She is not diaphoretic. No erythema. No pallor.   Psychiatric: She has a normal mood and affect. Her speech is normal and behavior is normal. Judgment and thought content normal. She is not actively hallucinating. Thought content is not paranoid and not delusional. She expresses no homicidal and no suicidal ideation. She expresses no suicidal plans and no homicidal plans. She is attentive.   Nursing note and vitals reviewed.        Assessment/Plan   Nicol was seen today for follow-up.    Diagnoses and all orders for this visit:    Anxiety with depression    Hypercalcemia  -     Ambulatory Referral to Endocrinology    Other orders  -     busPIRone (BUSPAR) 10 MG tablet; Take 1 tablet by mouth 3 (Three) Times a Day for 30 days.  -     sertraline (ZOLOFT) 100 MG tablet; Take 1 tablet by mouth Daily for 30 days.  -     hydrOXYzine (ATARAX) 25 MG tablet; Take 1 tablet by mouth At Night As Needed for Itching for up to 30 days.      1.  Anxiety with depression-controlled.  Refill BuSpar 10 mg 1 tablet 3 times daily.  Zoloft 100 mg 1 tablet p.o. daily.  Patient instructed to stop medication if symptoms worsen.  Patient instructed to present to the ER if she develops suicidal or homicidal ideations.  Patient verbalized understanding of instruction.    2.  Hypercalcemia-after reviewing patient's previous labs it is noted she has had intermittent elevated calcium levels in 1 previous elevated PTH level.  She did have a parathyroid scan by previous provider which was negative per radiology report.  She had no additional follow-up at that point.  Referral to endocrinology for further evaluation and  possible referral to general surgery.    3.  Follow-up in 3 months or sooner if needed.            This document has been electronically signed by ARIANNA Lo on February 11, 2019 4:12 PM

## 2019-02-11 NOTE — TELEPHONE ENCOUNTER
Per ARIANNA Slade, Nicol Humphrey was called to inform her that a referral was sent to Endocrinology for further evaluation due to elevated calcium levels. Endocrinology to call to schedule appt. Patient verbalized understanding.      ----- Message from ARIANNA Lo sent at 2/11/2019  4:13 PM CST -----  Regarding: Endocrinology referral  Can you call the patient let her know I am referring her to endocrinology for further evaluation related to her elevated calcium level.

## 2019-04-06 DIAGNOSIS — F32.A DEPRESSION, UNSPECIFIED DEPRESSION TYPE: ICD-10-CM

## 2019-04-06 DIAGNOSIS — F41.1 GENERALIZED ANXIETY DISORDER: ICD-10-CM

## 2019-04-08 RX ORDER — BUPROPION HYDROCHLORIDE 150 MG/1
TABLET ORAL
Qty: 30 TABLET | Refills: 2 | OUTPATIENT
Start: 2019-04-08

## 2019-04-08 RX ORDER — BUSPIRONE HYDROCHLORIDE 10 MG/1
10 TABLET ORAL 3 TIMES DAILY
Qty: 90 TABLET | Refills: 1 | Status: SHIPPED | OUTPATIENT
Start: 2019-04-08 | End: 2019-06-01 | Stop reason: SDUPTHER

## 2019-05-04 DIAGNOSIS — K21.9 GASTROESOPHAGEAL REFLUX DISEASE WITHOUT ESOPHAGITIS: ICD-10-CM

## 2019-05-06 RX ORDER — PANTOPRAZOLE SODIUM 40 MG/1
TABLET, DELAYED RELEASE ORAL
Qty: 30 TABLET | Refills: 6 | Status: SHIPPED | OUTPATIENT
Start: 2019-05-06 | End: 2019-05-10

## 2019-05-06 RX ORDER — HYDROXYZINE HYDROCHLORIDE 25 MG/1
TABLET, FILM COATED ORAL
Qty: 60 TABLET | Refills: 1 | OUTPATIENT
Start: 2019-05-06

## 2019-05-10 ENCOUNTER — OFFICE VISIT (OUTPATIENT)
Dept: FAMILY MEDICINE CLINIC | Facility: CLINIC | Age: 34
End: 2019-05-10

## 2019-05-10 ENCOUNTER — LAB (OUTPATIENT)
Dept: LAB | Facility: HOSPITAL | Age: 34
End: 2019-05-10

## 2019-05-10 VITALS
HEIGHT: 68 IN | DIASTOLIC BLOOD PRESSURE: 80 MMHG | HEART RATE: 79 BPM | BODY MASS INDEX: 35.58 KG/M2 | RESPIRATION RATE: 20 BRPM | WEIGHT: 234.8 LBS | SYSTOLIC BLOOD PRESSURE: 130 MMHG | OXYGEN SATURATION: 98 %

## 2019-05-10 DIAGNOSIS — E83.52 SERUM CALCIUM ELEVATED: ICD-10-CM

## 2019-05-10 DIAGNOSIS — F41.1 GENERALIZED ANXIETY DISORDER: Primary | ICD-10-CM

## 2019-05-10 DIAGNOSIS — K21.9 GASTROESOPHAGEAL REFLUX DISEASE WITHOUT ESOPHAGITIS: ICD-10-CM

## 2019-05-10 LAB
25(OH)D3 SERPL-MCNC: 17.2 NG/ML (ref 30–100)
CALCIUM SPEC-SCNC: 10.6 MG/DL (ref 8.6–10.5)
PTH-INTACT SERPL-MCNC: 151 PG/ML (ref 15–65)

## 2019-05-10 PROCEDURE — 82310 ASSAY OF CALCIUM: CPT

## 2019-05-10 PROCEDURE — 99213 OFFICE O/P EST LOW 20 MIN: CPT | Performed by: NURSE PRACTITIONER

## 2019-05-10 PROCEDURE — 82306 VITAMIN D 25 HYDROXY: CPT

## 2019-05-10 PROCEDURE — 36415 COLL VENOUS BLD VENIPUNCTURE: CPT

## 2019-05-10 PROCEDURE — 83970 ASSAY OF PARATHORMONE: CPT

## 2019-05-10 RX ORDER — DEXLANSOPRAZOLE 60 MG/1
60 CAPSULE, DELAYED RELEASE ORAL DAILY
Qty: 30 CAPSULE | Refills: 3 | Status: SHIPPED | OUTPATIENT
Start: 2019-05-10 | End: 2019-06-09

## 2019-05-10 RX ORDER — DIAZEPAM 5 MG/1
5 TABLET ORAL ONCE
Qty: 1 TABLET | Refills: 0 | Status: SHIPPED | OUTPATIENT
Start: 2019-05-10 | End: 2019-05-10

## 2019-05-10 RX ORDER — HYDROXYZINE HYDROCHLORIDE 25 MG/1
25 TABLET, FILM COATED ORAL NIGHTLY PRN
Qty: 30 TABLET | Refills: 3 | Status: SHIPPED | OUTPATIENT
Start: 2019-05-10 | End: 2020-01-23 | Stop reason: SDUPTHER

## 2019-05-10 RX ORDER — SERTRALINE HYDROCHLORIDE 100 MG/1
TABLET, FILM COATED ORAL
COMMUNITY
Start: 2019-05-05 | End: 2019-05-30 | Stop reason: ALTCHOICE

## 2019-05-10 NOTE — PROGRESS NOTES
Subjective   Nicol Humphrey is a 33 y.o. female.     Ms. Humphrey is a 33-year-old female who presents today for follow-up related to anxiety, GERD and history of elevated calcium level.  She feels though her anxiety is not well controlled with BuSpar and Zoloft.  She denies suicidal or homicidal ideations.  She also reports that she does not think that her reflux symptoms are controlled very well either.  She takes Protonix 40 mg p.o. daily and tries to avoid greasy spicy foods.  She does have a history of elevated calcium and parathyroid hormone levels.  She is scheduled to follow-up with endocrinology for further evaluation.         The following portions of the patient's history were reviewed and updated as appropriate: allergies, current medications, past family history, past medical history, past social history, past surgical history and problem list.    Review of Systems   Constitutional: Negative for activity change, appetite change, fatigue, unexpected weight gain and unexpected weight loss.   HENT: Negative for congestion, sore throat, trouble swallowing and voice change.    Eyes: Negative.    Respiratory: Negative for cough, chest tightness, shortness of breath and wheezing.    Cardiovascular: Negative for chest pain, palpitations and leg swelling.   Gastrointestinal: Positive for GERD. Negative for abdominal pain, constipation, diarrhea, nausea and vomiting.   Endocrine: Negative.    Genitourinary: Negative for dysuria.   Musculoskeletal: Negative for arthralgias and myalgias.   Skin: Negative for rash.   Allergic/Immunologic: Negative.    Neurological: Negative.    Hematological: Negative.    Psychiatric/Behavioral: Positive for stress. The patient is nervous/anxious.        Objective   Physical Exam   Constitutional: She is oriented to person, place, and time. She appears well-developed and well-nourished. No distress.   HENT:   Head: Normocephalic and atraumatic.   Eyes: Conjunctivae are normal.    Neck: Normal range of motion.   Cardiovascular: Normal rate, regular rhythm and normal heart sounds.   Pulmonary/Chest: Effort normal and breath sounds normal. No respiratory distress. She has no wheezes. She has no rales.   Abdominal: Soft. Bowel sounds are normal. She exhibits no distension and no mass. There is no tenderness. There is no rebound and no guarding. No hernia.   Musculoskeletal: Normal range of motion. She exhibits no edema or tenderness.   Neurological: She is alert and oriented to person, place, and time.   Skin: Skin is warm and dry. No rash noted. She is not diaphoretic. No erythema. No pallor.   Psychiatric: She has a normal mood and affect. Her behavior is normal. Judgment and thought content normal.   Nursing note and vitals reviewed.        Assessment/Plan   Nicol was seen today for follow-up.    Diagnoses and all orders for this visit:    Generalized anxiety disorder    Gastroesophageal reflux disease without esophagitis    Serum calcium elevated  -     PTH, Intact & Calcium; Future  -     Vitamin D 25 Hydroxy; Future    Other orders  -     hydrOXYzine (ATARAX) 25 MG tablet; Take 1 tablet by mouth At Night As Needed (sleep).  -     diazePAM (VALIUM) 5 MG tablet; Take 1 tablet by mouth 1 (One) Time for 1 dose. Take 1 hour before dental procedure.  -     dexlansoprazole (DEXILANT) 60 MG capsule; Take 1 capsule by mouth Daily for 30 days.    1.  Anxiety- GeneSight swab collected today.  Patient to follow-up in 2 weeks to discuss transitioning to a new medication.    2.  GERD- discontinue Protonix.  Dexilant 60 mg 1 capsule p.o. daily.  Patient instructed to avoid trigger foods.    3.  Elevated serum calcium- PTH intact and calcium, vitamin D.  Will call with results.  Follow-up with endocrinology as scheduled.    4.  Follow-up in 2 weeks or sooner if needed.            This document has been electronically signed by ARIANNA Lo on May 10, 2019 5:14 PM

## 2019-05-13 RX ORDER — ERGOCALCIFEROL 1.25 MG/1
50000 CAPSULE ORAL WEEKLY
Qty: 4 CAPSULE | Refills: 3 | Status: SHIPPED | OUTPATIENT
Start: 2019-05-13 | End: 2019-08-26 | Stop reason: SDUPTHER

## 2019-05-13 NOTE — PROGRESS NOTES
Vitamin D low.  I sent a prescription to start her on vitamin D 50,000 units p.o. weekly.  She is scheduled to follow-up with endocrinology regarding her PTH and calcium.

## 2019-05-30 ENCOUNTER — OFFICE VISIT (OUTPATIENT)
Dept: FAMILY MEDICINE CLINIC | Facility: CLINIC | Age: 34
End: 2019-05-30

## 2019-05-30 VITALS
WEIGHT: 236.7 LBS | BODY MASS INDEX: 35.87 KG/M2 | RESPIRATION RATE: 20 BRPM | SYSTOLIC BLOOD PRESSURE: 122 MMHG | HEART RATE: 74 BPM | OXYGEN SATURATION: 99 % | HEIGHT: 68 IN | DIASTOLIC BLOOD PRESSURE: 80 MMHG

## 2019-05-30 DIAGNOSIS — F32.A ANXIETY AND DEPRESSION: Primary | ICD-10-CM

## 2019-05-30 DIAGNOSIS — K21.9 GASTROESOPHAGEAL REFLUX DISEASE, ESOPHAGITIS PRESENCE NOT SPECIFIED: ICD-10-CM

## 2019-05-30 DIAGNOSIS — F41.9 ANXIETY AND DEPRESSION: Primary | ICD-10-CM

## 2019-05-30 PROCEDURE — 99214 OFFICE O/P EST MOD 30 MIN: CPT | Performed by: NURSE PRACTITIONER

## 2019-05-30 RX ORDER — ALBUTEROL SULFATE 90 UG/1
AEROSOL, METERED RESPIRATORY (INHALATION)
Refills: 0 | COMMUNITY
Start: 2019-05-27 | End: 2019-06-18

## 2019-05-30 RX ORDER — PREDNISONE 10 MG/1
TABLET ORAL
Refills: 0 | COMMUNITY
Start: 2019-05-27 | End: 2019-06-18

## 2019-05-30 RX ORDER — DOXYCYCLINE 100 MG/1
CAPSULE ORAL
Refills: 0 | COMMUNITY
Start: 2019-05-27 | End: 2019-06-18

## 2019-05-30 RX ORDER — DIAZEPAM 5 MG/1
TABLET ORAL
Refills: 0 | COMMUNITY
Start: 2019-05-10 | End: 2019-06-18

## 2019-05-30 RX ORDER — VENLAFAXINE HYDROCHLORIDE 37.5 MG/1
37.5 CAPSULE, EXTENDED RELEASE ORAL DAILY
Qty: 30 CAPSULE | Refills: 1 | Status: SHIPPED | OUTPATIENT
Start: 2019-05-30 | End: 2019-07-11

## 2019-05-30 NOTE — PROGRESS NOTES
Subjective   Nicol Humphrey is a 33 y.o. female.     Ms. Humphrey is a 33-year-old female presents today for follow-up related to anxiety and depression and GERD.  At last visit a GeneSight swab was collected to further evaluate for medication therapy.  She currently takes Zoloft 100 mg p.o. daily.  She states this does not effectively control her anxiety and depression symptoms.  She denies suicidal or homicidal ideations.  She has tried Prozac and Wellbutrin in the past which were also ineffective.  She is currently taking Protonix 40 mg p.o. daily for the management of her GERD.  She reports this is not controlling her symptoms very well. Dexilant was not approved by her insurance.         The following portions of the patient's history were reviewed and updated as appropriate: allergies, current medications, past family history, past medical history, past social history, past surgical history and problem list.    Review of Systems   Constitutional: Negative for activity change, appetite change, fatigue, unexpected weight gain and unexpected weight loss.   HENT: Negative for congestion, sore throat, trouble swallowing and voice change.    Eyes: Negative.    Respiratory: Negative for cough, chest tightness, shortness of breath and wheezing.    Cardiovascular: Negative for chest pain, palpitations and leg swelling.   Gastrointestinal: Positive for GERD. Negative for abdominal pain, constipation, diarrhea, nausea and vomiting.   Endocrine: Negative.    Genitourinary: Negative for dysuria.   Musculoskeletal: Negative for arthralgias and myalgias.   Skin: Negative for rash.   Allergic/Immunologic: Negative.    Neurological: Negative.    Hematological: Negative.    Psychiatric/Behavioral: Positive for depressed mood and stress. Negative for suicidal ideas. The patient is nervous/anxious.        Objective   Physical Exam   Constitutional: She is oriented to person, place, and time. She appears well-developed and  well-nourished. No distress.   HENT:   Head: Normocephalic and atraumatic.   Eyes: Conjunctivae are normal.   Neck: Normal range of motion.   Cardiovascular: Normal rate, regular rhythm and normal heart sounds.   Pulmonary/Chest: Effort normal and breath sounds normal. No stridor. No respiratory distress. She has no wheezes. She has no rales.   Abdominal: Soft. Bowel sounds are normal. She exhibits no distension and no mass. There is no tenderness. There is no rebound and no guarding. No hernia.   Musculoskeletal: Normal range of motion. She exhibits no edema or tenderness.   Neurological: She is alert and oriented to person, place, and time.   Skin: Skin is warm and dry. No rash noted. She is not diaphoretic. No erythema. No pallor.   Psychiatric: Her speech is normal and behavior is normal. Judgment and thought content normal. Her mood appears not anxious. Her affect is not angry, not labile and not inappropriate. She is not actively hallucinating. Thought content is not paranoid and not delusional. Cognition and memory are normal. She exhibits a depressed mood. She expresses no homicidal and no suicidal ideation. She expresses no suicidal plans and no homicidal plans. She is attentive.   Nursing note and vitals reviewed.        Assessment/Plan   Nicol was seen today for follow-up.    Diagnoses and all orders for this visit:    Anxiety and depression    Gastroesophageal reflux disease, esophagitis presence not specified  -     Ambulatory Referral to Gastroenterology    Other orders  -     venlafaxine XR (EFFEXOR XR) 37.5 MG 24 hr capsule; Take 1 capsule by mouth Daily.    1.  Anxiety with depression- discontinue Zoloft.  Patient to taper by taking Zoloft every other day x1 week.  On week 2 she is to continue to take Zoloft every other day but take Effexor on the day she is not taking Zoloft.  On week 3 she is totally to discontinue Zoloft and go to Effexor daily.  Effexor 37 1/2 mg 1 capsule p.o. daily.   Patient instructed to stop medication if symptoms worsen.  Patient instructed to present to the ER she develop suicidal or homicidal ideations.  Patient verbalized understanding of instruction.    2.  GERD-referral to gastroenterology for further evaluation.    3.  Follow-up in 1 month or sooner if needed.            This document has been electronically signed by ARIANNA Lo on May 30, 2019 12:47 PM

## 2019-05-31 ENCOUNTER — OFFICE VISIT (OUTPATIENT)
Dept: GASTROENTEROLOGY | Facility: CLINIC | Age: 34
End: 2019-05-31

## 2019-05-31 VITALS
BODY MASS INDEX: 35.77 KG/M2 | WEIGHT: 236 LBS | HEIGHT: 68 IN | HEART RATE: 72 BPM | SYSTOLIC BLOOD PRESSURE: 142 MMHG | DIASTOLIC BLOOD PRESSURE: 84 MMHG

## 2019-05-31 DIAGNOSIS — R10.10 UPPER ABDOMINAL PAIN: ICD-10-CM

## 2019-05-31 DIAGNOSIS — R11.0 NAUSEA: ICD-10-CM

## 2019-05-31 DIAGNOSIS — K21.9 GASTROESOPHAGEAL REFLUX DISEASE, ESOPHAGITIS PRESENCE NOT SPECIFIED: Primary | ICD-10-CM

## 2019-05-31 PROCEDURE — 99214 OFFICE O/P EST MOD 30 MIN: CPT | Performed by: NURSE PRACTITIONER

## 2019-05-31 RX ORDER — DEXTROSE AND SODIUM CHLORIDE 5; .45 G/100ML; G/100ML
30 INJECTION, SOLUTION INTRAVENOUS CONTINUOUS PRN
Status: CANCELLED | OUTPATIENT
Start: 2019-08-28

## 2019-05-31 RX ORDER — SUCRALFATE ORAL 1 G/10ML
1 SUSPENSION ORAL
Qty: 1260 ML | Refills: 3 | Status: SHIPPED | OUTPATIENT
Start: 2019-05-31 | End: 2019-10-29 | Stop reason: SDUPTHER

## 2019-05-31 NOTE — PROGRESS NOTES
Chief Complaint   Patient presents with   • Heartburn       Subjective    Nicol Humphrey is a 33 y.o. female. she is here today   33-year-old female presents to discuss worsening reflux.  Has tried multiple different medications including Zantac, Pepcid, Prevacid, Nexium, Prilosec recently Dexilant was prescribed but has not been approved per insurance.  States frequently has epigastric pain and burning worsened with intake.  Reports 10 years ago she underwent EGD and had ulcer.  Unable to review those results.    Heartburn   She complains of abdominal pain, belching, dysphagia, early satiety, globus sensation, heartburn, a hoarse voice and nausea. She reports no chest pain, no choking, no coughing or no sore throat. This is a chronic problem. The current episode started more than 1 year ago. The problem occurs constantly. The problem has been waxing and waning. The heartburn duration is an hour. The heartburn is located in the substernum. The heartburn is of severe intensity. The heartburn does not wake her from sleep. The heartburn limits her activity. The heartburn changes with position. The symptoms are aggravated by certain foods, exertion and lying down. Associated symptoms include fatigue. Risk factors include smoking/tobacco exposure, NSAIDs and caffeine use (Ulcer 10 years ). She has tried a PPI, a histamine-2 antagonist and a diet change for the symptoms. Past procedures include an EGD.     Plan; we will schedule patient for EGD due to severe reflux.  Continue PPI daily we will add Carafate before meals and bedtime.       The following portions of the patient's history were reviewed and updated as appropriate:   Past Medical History:   Diagnosis Date   • Anxiety disorder     Anxiety disorder, unspecified      • Bronchitis    • Cholelithiasis    • Depression    • Encounter for  visit      visit status      • GERD (gastroesophageal reflux disease)    • Influenza    • Kidney stone    •  Pediculosis due to pediculus humanus capitis    • Rectocele complicating  care, baby not yet delivered     Routine  care      • Strep throat    • Supervision of other normal pregnancy    • Surgical follow-up care    • Transient hypertension of pregnancy     Transient hypertension of pregnancy - not delivered      • Upper respiratory infection    • Urinary tract infection    • Viral gastroenteritis      Past Surgical History:   Procedure Laterality Date   • ADENOIDECTOMY     •  SECTION      Repeat low transverse  with left partial salpingectomy. 2014       • CHOLECYSTECTOMY     • ECTOPIC PREGNANCY       Operative laparoscopy, right salpinectomy and removal of ectopic pregnancy. Right ectopic pregnancy. 2008       • INJECTION OF MEDICATION      B12 (1)      2010       • PAP SMEAR      Negative for intraepithelial lesion or malignancy. 2009       • TONSILLECTOMY     • TONSILLECTOMY AND ADENOIDECTOMY      Tonsillectomy and adenoidectomy. Chronic tonsillitis. 2000       • TUBAL ABDOMINAL LIGATION       Family History   Problem Relation Age of Onset   • Diabetes Other    • Thyroid disease Other         Grandfather and aunt    • Cervical cancer Mother    • Thyroid disease Mother    • No Known Problems Father    • Lung disease Daughter    • Kidney disease Daughter    • Heart disease Daughter    • No Known Problems Son    • Throat cancer Maternal Grandmother    • Lung cancer Maternal Grandfather    • Heart attack Paternal Grandmother    • Stroke Paternal Grandmother    • Stomach cancer Paternal Grandfather    • No Known Problems Daughter    • No Known Problems Daughter      OB History     No data available        Prior to Admission medications    Medication Sig Start Date End Date Taking? Authorizing Provider   albuterol sulfate  (90 Base) MCG/ACT inhaler TAKE 2 PUFFS BY MOUTH EVERY 4 HOURS AS NEEDED 19  Yes Provider, Historical, MD   azelastine  (ASTELIN) 0.1 % nasal spray 2 sprays into the nostril(s) as directed by provider 2 (Two) Times a Day. Use in each nostril as directed 19  Yes Lee Ann Rios APRN   diazePAM (VALIUM) 5 MG tablet TAKE 1 TABLET BY MOUTH 1 TIME 1 HOUR BEFORE DENTAL PROCEDURE. 5/10/19  Yes Vivek Garcia MD   doxycycline (MONODOX) 100 MG capsule TAKE 1 CAPSULE BY MOUTH TWICE A DAY FOR 7 DAYS 19  Yes ProviderVivek MD   hydrOXYzine (ATARAX) 25 MG tablet Take 1 tablet by mouth At Night As Needed (sleep). 5/10/19  Yes Isaiah Peter APRN   loratadine (CLARITIN) 10 MG tablet Take 1 tablet by mouth Daily. 19  Yes Lee Ann Rios APRN   predniSONE (DELTASONE) 10 MG tablet TAKE 2 TABLETS BY MOUTH TWICE A DAY FOR 5 DAYS 19  Yes ProviderVivek MD   venlafaxine XR (EFFEXOR XR) 37.5 MG 24 hr capsule Take 1 capsule by mouth Daily. 19  Yes Isaiah Peter APRN   vitamin D (ERGOCALCIFEROL) 41977 units capsule capsule Take 1 capsule by mouth 1 (One) Time Per Week. 19  Yes Isaiah Peter APRN   dexlansoprazole (DEXILANT) 60 MG capsule Take 1 capsule by mouth Daily for 30 days. 5/10/19 6/9/19  Isaiah Peter APRN     No Known Allergies  Social History     Socioeconomic History   • Marital status:      Spouse name: Not on file   • Number of children: Not on file   • Years of education: Not on file   • Highest education level: Not on file   Tobacco Use   • Smoking status: Current Every Day Smoker     Last attempt to quit: 1/3/2018     Years since quittin.4   • Smokeless tobacco: Never Used   Substance and Sexual Activity   • Alcohol use: No   • Drug use: No   • Sexual activity: Yes     Partners: Male     Birth control/protection: Surgical       Review of Systems  Review of Systems   Constitutional: Positive for activity change, appetite change and fatigue. Negative for chills, diaphoresis, fever and unexpected weight change.   HENT: Positive for hoarse voice and trouble  "swallowing. Negative for sore throat.    Respiratory: Negative for cough, choking and shortness of breath.    Cardiovascular: Negative for chest pain.   Gastrointestinal: Positive for abdominal distention (bloating ), abdominal pain, constipation, dysphagia, heartburn and nausea. Negative for anal bleeding, blood in stool, diarrhea, rectal pain and vomiting.   Musculoskeletal: Negative for arthralgias.   Skin: Negative for pallor.   Neurological: Negative for light-headedness.        /84 (BP Location: Left arm)   Pulse 72   Ht 172.7 cm (68\")   Wt 107 kg (236 lb)   LMP 05/07/2019 (Exact Date)   BMI 35.88 kg/m²     Objective    Physical Exam   Constitutional: She is oriented to person, place, and time. She appears well-developed and well-nourished. She is cooperative. No distress.   HENT:   Head: Normocephalic and atraumatic.   Neck: Normal range of motion. Neck supple. No thyromegaly present.   Cardiovascular: Normal rate, regular rhythm and normal heart sounds.   Pulmonary/Chest: Effort normal and breath sounds normal. She has no wheezes. She has no rhonchi. She has no rales.   Abdominal: Soft. Normal appearance and bowel sounds are normal. She exhibits no distension. There is no hepatosplenomegaly. There is tenderness in the right upper quadrant, epigastric area and left upper quadrant. There is no rigidity and no guarding. No hernia.   Lymphadenopathy:     She has no cervical adenopathy.   Neurological: She is alert and oriented to person, place, and time.   Skin: Skin is warm, dry and intact. No rash noted. No pallor.   Psychiatric: She has a normal mood and affect. Her speech is normal.     Lab on 05/10/2019   Component Date Value Ref Range Status   • PTH, Intact 05/10/2019 151.0* 15.0 - 65.0 pg/mL Final   • Calcium 05/10/2019 10.6* 8.6 - 10.5 mg/dL Final   • 25 Hydroxy, Vitamin D 05/10/2019 17.2* 30.0 - 100.0 ng/ml Final     Assessment/Plan      1. Gastroesophageal reflux disease, esophagitis " presence not specified    2. Upper abdominal pain    3. Nausea    .       Orders placed during this encounter include:  Orders Placed This Encounter   Procedures   • Follow Anesthesia Guidelines / Standing Orders     Standing Status:   Future   • Obtain Informed Consent     Standing Status:   Future     Order Specific Question:   Informed Consent Given For     Answer:   ESOPHAGOGASTRODUODENOSCOPY possible dilation       ESOPHAGOGASTRODUODENOSCOPY possible dilation (N/A)    Review and/or summary of lab tests, radiology, procedures, medications. Review and summary of old records and obtaining of history. The risks and benefits of my recommendations, as well as other treatment options were discussed with the patient today. Questions were answered.    New Medications Ordered This Visit   Medications   • sucralfate (CARAFATE) 1 GM/10ML suspension     Sig: Take 10 mL by mouth 4 (Four) Times a Day With Meals & at Bedtime.     Dispense:  1260 mL     Refill:  3       Follow-up: Return in about 4 weeks (around 6/28/2019).          This document has been electronically signed by ARIANNA Alicea on May 31, 2019 11:50 AM             Results for orders placed or performed in visit on 05/10/19   PTH, Intact & Calcium   Result Value Ref Range    PTH, Intact 151.0 (H) 15.0 - 65.0 pg/mL    Calcium 10.6 (H) 8.6 - 10.5 mg/dL   Vitamin D 25 Hydroxy   Result Value Ref Range    25 Hydroxy, Vitamin D 17.2 (L) 30.0 - 100.0 ng/ml   Results for orders placed or performed during the hospital encounter of 04/17/19   POCT Rapid Strep A   Result Value Ref Range    Rapid Strep A Screen Negative Negative, VALID, INVALID, Not Performed    Internal Control Passed Passed    Lot Number [id number]     Expiration Date 4/30/20    Results for orders placed or performed in visit on 01/14/19   CBC Auto Differential   Result Value Ref Range    WBC 7.16 3.20 - 9.80 10*3/mm3    RBC 4.05 3.77 - 5.16 10*6/mm3    Hemoglobin 13.1 12.0 - 15.5 g/dL    Hematocrit  38.6 35.0 - 45.0 %    MCV 95.3 80.0 - 98.0 fL    MCH 32.3 26.5 - 34.0 pg    MCHC 33.9 31.4 - 36.0 g/dL    RDW 12.6 11.5 - 14.5 %    RDW-SD 44.1 36.4 - 46.3 fl    MPV 9.6 8.0 - 12.0 fL    Platelets 353 150 - 450 10*3/mm3    Neutrophil % 58.4 37.0 - 80.0 %    Lymphocyte % 32.4 10.0 - 50.0 %    Monocyte % 7.3 0.0 - 12.0 %    Eosinophil % 1.3 0.0 - 7.0 %    Basophil % 0.3 0.0 - 2.0 %    Immature Grans % 0.3 0.0 - 0.5 %    Neutrophils, Absolute 4.19 2.00 - 8.60 10*3/mm3    Lymphocytes, Absolute 2.32 0.60 - 4.20 10*3/mm3    Monocytes, Absolute 0.52 0.00 - 0.90 10*3/mm3    Eosinophils, Absolute 0.09 0.00 - 0.70 10*3/mm3    Basophils, Absolute 0.02 0.00 - 0.20 10*3/mm3    Immature Grans, Absolute 0.02 0.00 - 0.02 10*3/mm3   TSH   Result Value Ref Range    TSH 1.880 0.460 - 4.680 mIU/mL   Hemoglobin A1c   Result Value Ref Range    Hemoglobin A1C 5.1 4 - 5.6 %   Comprehensive Metabolic Panel   Result Value Ref Range    Glucose 91 60 - 100 mg/dL    BUN 8 7 - 21 mg/dL    Creatinine 0.67 0.50 - 1.00 mg/dL    Sodium 136 (L) 137 - 145 mmol/L    Potassium 4.2 3.5 - 5.1 mmol/L    Chloride 105 95 - 110 mmol/L    CO2 27.0 22.0 - 31.0 mmol/L    Calcium 10.7 (H) 8.4 - 10.2 mg/dL    Total Protein 6.8 6.3 - 8.6 g/dL    Albumin 4.40 3.40 - 4.80 g/dL    ALT (SGPT) 16 9 - 52 U/L    AST (SGOT) 26 14 - 36 U/L    Alkaline Phosphatase 71 38 - 126 U/L    Total Bilirubin 0.2 0.2 - 1.3 mg/dL    eGFR Non  Amer 102 64 - 149 mL/min/1.73    Globulin 2.4 2.3 - 3.5 gm/dL    A/G Ratio 1.8 1.1 - 1.8 g/dL    BUN/Creatinine Ratio 11.9 7.0 - 25.0    Anion Gap 4.0 (L) 5.0 - 15.0 mmol/L   Results for orders placed or performed in visit on 04/09/18   HPV DNA Probe, Direct - ThinPrep Vial, Vagina   Result Value Ref Range    HPV Aptima Negative Negative   Liquid-based Pap Smear, Screening   Result Value Ref Range    Case Report       Gynecologic Cytology Report                       Case: [id number]                                  Authorizing Provider:   Maria L Jain MD         Collected:           04/09/2018 10:14 AM          Ordering Location:     [facility]     Received:            04/09/2018 10:53 AM                                 [facility]                                                                 First Screen:          Linette Jaime                                                           Specimen:    Liquid-Based Pap, Screening, Cervix                                                        Interpretation Negative for intraepithelial lesion or malignancy      Specimen Adequacy       Satisfactory for evaluation, endocervical/transformation zone component present    Additional Information       Disclaimer: Cervical cytology is a screening test primarily for squamous cancer and its precursors and has associated false-negative and false-positive results.  Technologies such as liquid-based preparations may decrease but will not eliminate all false-negative results.  Follow-up of unexplained clinical signs and symptoms is recommended to minimize false-negative results. (The [place] System for Reporting Cervical Cytology: [employer], 2015).      Embedded Images     Results for orders placed or performed during the hospital encounter of 02/22/18   CBC Auto Differential   Result Value Ref Range    WBC 6.30 3.20 - 9.80 10*3/mm3    RBC 4.12 3.77 - 5.16 10*6/mm3    Hemoglobin 13.4 12.0 - 15.5 g/dL    Hematocrit 38.8 35.0 - 45.0 %    MCV 94.2 80.0 - 98.0 fL    MCH 32.5 26.5 - 34.0 pg    MCHC 34.5 31.4 - 36.0 g/dL    RDW 12.4 11.5 - 14.5 %    RDW-SD 42.7 36.4 - 46.3 fl    MPV 8.8 8.0 - 12.0 fL    Platelets 306 150 - 450 10*3/mm3    Neutrophil % 74.4 37.0 - 80.0 %    Lymphocyte % 18.1 10.0 - 50.0 %    Monocyte % 6.0 0.0 - 12.0 %    Eosinophil % 1.3 0.0 - 7.0 %    Basophil % 0.2 0.0 - 2.0 %    Immature Grans % 0.0 0.0 - 0.5 %    Neutrophils, Absolute 4.69 2.00 - 8.60 10*3/mm3    Lymphocytes, Absolute 1.14 0.60 - 4.20 10*3/mm3    Monocytes,  Absolute 0.38 0.00 - 0.90 10*3/mm3    Eosinophils, Absolute 0.08 0.00 - 0.70 10*3/mm3    Basophils, Absolute 0.01 0.00 - 0.20 10*3/mm3    Immature Grans, Absolute 0.00 0.00 - 0.02 10*3/mm3   hCG, Serum, Qualitative   Result Value Ref Range    HCG Qualitative Negative Negative   Lipase   Result Value Ref Range    Lipase 52 23 - 300 U/L   Comprehensive Metabolic Panel   Result Value Ref Range    Glucose 94 60 - 100 mg/dL    BUN 15 7 - 21 mg/dL    Creatinine 0.70 0.50 - 1.00 mg/dL    Sodium 136 (L) 137 - 145 mmol/L    Potassium 3.9 3.5 - 5.1 mmol/L    Chloride 102 95 - 110 mmol/L    CO2 23.0 22.0 - 31.0 mmol/L    Calcium 9.8 8.4 - 10.2 mg/dL    Total Protein 6.9 6.3 - 8.6 g/dL    Albumin 4.00 3.40 - 4.80 g/dL    ALT (SGPT) 37 9 - 52 U/L    AST (SGOT) 29 14 - 36 U/L    Alkaline Phosphatase 83 38 - 126 U/L    Total Bilirubin 0.5 0.2 - 1.3 mg/dL    eGFR Non  Amer 98 64 - 149 mL/min/1.73    Globulin 2.9 2.3 - 3.5 gm/dL    A/G Ratio 1.4 1.1 - 1.8 g/dL    BUN/Creatinine Ratio 21.4 7.0 - 25.0    Anion Gap 11.0 5.0 - 15.0 mmol/L     *Note: Due to a large number of results and/or encounters for the requested time period, some results have not been displayed. A complete set of results can be found in Results Review.

## 2019-05-31 NOTE — PATIENT INSTRUCTIONS
Heartburn  Heartburn is a type of pain or discomfort that can happen in the throat or chest. It is often described as a burning pain. It may also cause a bad taste in the mouth. Heartburn may feel worse when you lie down or bend over, and it is often worse at night. Heartburn may be caused by stomach contents that move back up into the esophagus (reflux).  Follow these instructions at home:  Take these actions to decrease your discomfort and to help avoid complications.  Diet  · Follow a diet as recommended by your health care provider. This may involve avoiding foods and drinks such as:  ? Coffee and tea (with or without caffeine).  ? Drinks that contain alcohol.  ? Energy drinks and sports drinks.  ? Carbonated drinks or sodas.  ? Chocolate and cocoa.  ? Peppermint and mint flavorings.  ? Garlic and onions.  ? Horseradish.  ? Spicy and acidic foods, including peppers, chili powder, swift powder, vinegar, hot sauces, and barbecue sauce.  ? Citrus fruit juices and citrus fruits, such as oranges, jayant, and limes.  ? Tomato-based foods, such as red sauce, chili, salsa, and pizza with red sauce.  ? Fried and fatty foods, such as donuts, french fries, potato chips, and high-fat dressings.  ? High-fat meats, such as hot dogs and fatty cuts of red and white meats, such as rib eye steak, sausage, ham, and stapleton.  ? High-fat dairy items, such as whole milk, butter, and cream cheese.  · Eat small, frequent meals instead of large meals.  · Avoid drinking large amounts of liquid with your meals.  · Avoid eating meals during the 2-3 hours before bedtime.  · Avoid lying down right after you eat.  · Do not exercise right after you eat.  General instructions  · Pay attention to any changes in your symptoms.  · Take over-the-counter and prescription medicines only as told by your health care provider. Do not take aspirin, ibuprofen, or other NSAIDs unless your health care provider told you to do so.  · Do not use any tobacco  products, including cigarettes, chewing tobacco, and e-cigarettes. If you need help quitting, ask your health care provider.  · Wear loose-fitting clothing. Do not wear anything tight around your waist that causes pressure on your abdomen.  · Raise (elevate) the head of your bed about 6 inches (15 cm).  · Try to reduce your stress, such as with yoga or meditation. If you need help reducing stress, ask your health care provider.  · If you are overweight, reduce your weight to an amount that is healthy for you. Ask your health care provider for guidance about a safe weight loss goal.  · Keep all follow-up visits as told by your health care provider. This is important.  Contact a health care provider if:  · You have new symptoms.  · You have unexplained weight loss.  · You have difficulty swallowing, or it hurts to swallow.  · You have wheezing or a persistent cough.  · Your symptoms do not improve with treatment.  · You have frequent heartburn for more than two weeks.  Get help right away if:  · You have pain in your arms, neck, jaw, teeth, or back.  · You feel sweaty, dizzy, or light-headed.  · You have chest pain or shortness of breath.  · You vomit and your vomit looks like blood or coffee grounds.  · Your stool is bloody or black.  This information is not intended to replace advice given to you by your health care provider. Make sure you discuss any questions you have with your health care provider.  Document Released: 05/06/2010 Document Revised: 05/25/2017 Document Reviewed: 04/13/2016  [employer] Interactive Patient Education © 2019 [employer]

## 2019-06-03 RX ORDER — BUSPIRONE HYDROCHLORIDE 10 MG/1
TABLET ORAL
Qty: 90 TABLET | Refills: 1 | Status: SHIPPED | OUTPATIENT
Start: 2019-06-03 | End: 2019-07-16

## 2019-06-03 RX ORDER — SERTRALINE HYDROCHLORIDE 100 MG/1
TABLET, FILM COATED ORAL
Qty: 30 TABLET | Refills: 2 | Status: SHIPPED | OUTPATIENT
Start: 2019-06-03 | End: 2019-06-18

## 2019-06-18 ENCOUNTER — OFFICE VISIT (OUTPATIENT)
Dept: ENDOCRINOLOGY | Facility: CLINIC | Age: 34
End: 2019-06-18

## 2019-06-18 ENCOUNTER — APPOINTMENT (OUTPATIENT)
Dept: LAB | Facility: HOSPITAL | Age: 34
End: 2019-06-18

## 2019-06-18 VITALS
SYSTOLIC BLOOD PRESSURE: 132 MMHG | BODY MASS INDEX: 36.21 KG/M2 | HEIGHT: 68 IN | DIASTOLIC BLOOD PRESSURE: 78 MMHG | WEIGHT: 238.9 LBS | HEART RATE: 102 BPM | OXYGEN SATURATION: 99 %

## 2019-06-18 DIAGNOSIS — E21.0 PRIMARY HYPERPARATHYROIDISM (HCC): Primary | ICD-10-CM

## 2019-06-18 LAB
25(OH)D3 SERPL-MCNC: 15.4 NG/ML (ref 30–100)
ALBUMIN SERPL-MCNC: 4.2 G/DL (ref 3.5–5.2)
ANION GAP SERPL CALCULATED.3IONS-SCNC: 9.8 MMOL/L
BUN BLD-MCNC: 10 MG/DL (ref 6–20)
BUN/CREAT SERPL: 14.9 (ref 7–25)
CALCIUM SPEC-SCNC: 10.6 MG/DL (ref 8.6–10.5)
CHLORIDE SERPL-SCNC: 106 MMOL/L (ref 98–107)
CO2 SERPL-SCNC: 24.2 MMOL/L (ref 22–29)
CREAT BLD-MCNC: 0.67 MG/DL (ref 0.57–1)
GFR SERPL CREATININE-BSD FRML MDRD: 102 ML/MIN/1.73
GLUCOSE BLD-MCNC: 92 MG/DL (ref 65–99)
PHOSPHATE SERPL-MCNC: 2.6 MG/DL (ref 2.5–4.5)
POTASSIUM BLD-SCNC: 5.1 MMOL/L (ref 3.5–5.2)
PROLACTIN SERPL-MCNC: 4.46 NG/ML (ref 4.79–23.3)
SODIUM BLD-SCNC: 140 MMOL/L (ref 136–145)
TSH SERPL DL<=0.05 MIU/L-ACNC: 1.39 MIU/ML (ref 0.27–4.2)

## 2019-06-18 PROCEDURE — 80069 RENAL FUNCTION PANEL: CPT | Performed by: INTERNAL MEDICINE

## 2019-06-18 PROCEDURE — 84146 ASSAY OF PROLACTIN: CPT | Performed by: INTERNAL MEDICINE

## 2019-06-18 PROCEDURE — 82306 VITAMIN D 25 HYDROXY: CPT | Performed by: INTERNAL MEDICINE

## 2019-06-18 PROCEDURE — 84305 ASSAY OF SOMATOMEDIN: CPT | Performed by: INTERNAL MEDICINE

## 2019-06-18 PROCEDURE — 99205 OFFICE O/P NEW HI 60 MIN: CPT | Performed by: INTERNAL MEDICINE

## 2019-06-18 PROCEDURE — 36415 COLL VENOUS BLD VENIPUNCTURE: CPT | Performed by: INTERNAL MEDICINE

## 2019-06-18 PROCEDURE — 84443 ASSAY THYROID STIM HORMONE: CPT | Performed by: INTERNAL MEDICINE

## 2019-06-18 NOTE — PROGRESS NOTES
Nicol Humphrey is a 33 y.o. female who presents for  evaluation of   Chief Complaint   Patient presents with   • high calcium       Referring provider    Isaiah Peter APRN  [address]  [address] [place] [address]    Primary Care Provider    Isaiah Peter APRN    33-year-old female is referred for hypercalcemia.    Duration since  per our records.    Quantity calcium has been elevated but not above 11.    Symptoms include heartburn, myalgias, arthralgias, constipation, anxiety and depression.    Alleviating factors none    Aggravating factors none.    Severity, high given associated nephrolithiasis as well as heartburn as well as symptoms      Past Medical History:   Diagnosis Date   • Anxiety disorder     Anxiety disorder, unspecified      • Bronchitis    • Cholelithiasis    • Depression    • Encounter for  visit      visit status      • GERD (gastroesophageal reflux disease)    • Influenza    • Kidney stone    • Pediculosis due to pediculus humanus capitis    • Rectocele complicating  care, baby not yet delivered     Routine  care      • Strep throat    • Supervision of other normal pregnancy    • Surgical follow-up care    • Transient hypertension of pregnancy     Transient hypertension of pregnancy - not delivered      • Upper respiratory infection    • Urinary tract infection    • Viral gastroenteritis      Family History   Problem Relation Age of Onset   • Diabetes Other    • Thyroid disease Other         Grandfather and aunt    • Cervical cancer Mother    • Thyroid disease Mother    • No Known Problems Father    • Lung disease Daughter    • Kidney disease Daughter    • Heart disease Daughter    • No Known Problems Son    • Throat cancer Maternal Grandmother    • Lung cancer Maternal Grandfather    • Heart attack Paternal Grandmother    • Stroke Paternal Grandmother    • Stomach cancer Paternal Grandfather    • No Known Problems Daughter    • No  Known Problems Daughter      Social History     Tobacco Use   • Smoking status: Current Every Day Smoker     Last attempt to quit: 1/3/2018     Years since quittin.4   • Smokeless tobacco: Never Used   Substance Use Topics   • Alcohol use: No   • Drug use: No         Current Outpatient Medications:   •  busPIRone (BUSPAR) 10 MG tablet, TAKE 1 TABLET BY MOUTH 3 TIMES A DAY FOR 30 DAYS, Disp: 90 tablet, Rfl: 1  •  hydrOXYzine (ATARAX) 25 MG tablet, Take 1 tablet by mouth At Night As Needed (sleep)., Disp: 30 tablet, Rfl: 3  •  sucralfate (CARAFATE) 1 GM/10ML suspension, Take 10 mL by mouth 4 (Four) Times a Day With Meals & at Bedtime., Disp: 1260 mL, Rfl: 3  •  venlafaxine XR (EFFEXOR XR) 37.5 MG 24 hr capsule, Take 1 capsule by mouth Daily., Disp: 30 capsule, Rfl: 1  •  vitamin D (ERGOCALCIFEROL) 60855 units capsule capsule, Take 1 capsule by mouth 1 (One) Time Per Week., Disp: 4 capsule, Rfl: 3    Review of Systems    Review of Systems   Constitutional: Positive for fatigue. Negative for activity change, appetite change, chills, diaphoresis, fever and unexpected weight change.   HENT: Negative for congestion, dental problem, drooling, ear discharge, ear pain, facial swelling, mouth sores, postnasal drip, rhinorrhea, sinus pressure, sore throat, tinnitus, trouble swallowing and voice change.    Eyes: Negative for photophobia, pain, discharge, redness, itching and visual disturbance.   Respiratory: Negative for apnea, cough, choking, chest tightness, shortness of breath, wheezing and stridor.    Cardiovascular: Negative for chest pain, palpitations and leg swelling.   Gastrointestinal: Negative for abdominal distention, abdominal pain, constipation, diarrhea, nausea and vomiting.   Endocrine: Negative for cold intolerance, heat intolerance, polydipsia, polyphagia and polyuria.   Genitourinary: Negative for decreased urine volume, difficulty urinating, dysuria, flank pain, frequency, hematuria and urgency.  "  Musculoskeletal: Positive for arthralgias and myalgias. Negative for back pain, gait problem, joint swelling, neck pain and neck stiffness.   Skin: Negative for color change, pallor, rash and wound.   Allergic/Immunologic: Negative for immunocompromised state.   Neurological: Positive for weakness. Negative for dizziness, tremors, seizures, syncope, facial asymmetry, speech difficulty, light-headedness, numbness and headaches.   Hematological: Negative for adenopathy.   Psychiatric/Behavioral: Positive for decreased concentration. Negative for agitation, behavioral problems, confusion, dysphoric mood, hallucinations, self-injury, sleep disturbance and suicidal ideas. The patient is not nervous/anxious and is not hyperactive.         Objective:   /78   Pulse 102   Ht 172.7 cm (68\")   Wt 108 kg (238 lb 14.4 oz)   SpO2 99%   BMI 36.32 kg/m²     Physical Exam   Constitutional: She is oriented to person, place, and time. She appears well-developed.   HENT:   Head: Normocephalic.   Right Ear: External ear normal.   Left Ear: External ear normal.   Nose: Nose normal.   Eyes: Conjunctivae and EOM are normal. No scleral icterus.   Neck: Normal range of motion. Neck supple. No tracheal deviation present. No thyromegaly present.   Cardiovascular: Normal rate, regular rhythm, normal heart sounds and intact distal pulses. Exam reveals no gallop and no friction rub.   No murmur heard.  Pulmonary/Chest: Effort normal and breath sounds normal. No stridor. No respiratory distress. She has no wheezes. She has no rales. She exhibits no tenderness.   Abdominal: Soft. Bowel sounds are normal. She exhibits no distension and no mass. There is no tenderness. There is no rebound and no guarding.   Musculoskeletal: Normal range of motion. She exhibits no tenderness or deformity.   Lymphadenopathy:     She has no cervical adenopathy.   Neurological: She is alert and oriented to person, place, and time. She displays normal " reflexes. She exhibits normal muscle tone. Coordination normal.   Skin: No rash noted. No erythema. No pallor.   Psychiatric: She has a normal mood and affect. Her behavior is normal. Judgment and thought content normal.       Lab Review    Results for orders placed or performed in visit on 05/10/19   PTH, Intact & Calcium   Result Value Ref Range    PTH, Intact 151.0 (H) 15.0 - 65.0 pg/mL    Calcium 10.6 (H) 8.6 - 10.5 mg/dL   Vitamin D 25 Hydroxy   Result Value Ref Range    25 Hydroxy, Vitamin D 17.2 (L) 30.0 - 100.0 ng/ml         Assessment/Plan       ICD-10-CM ICD-9-CM   1. Primary hyperparathyroidism (CMS/[facility]) E21.0 252.01         I reviewed and summarized records from Isaiah Peter APRN from current year  and I reviewed / ordered labs.   From review of records :    Patient has Primary Hyperparathyroidism vs FHH    Obtain 24 h urine calcium and repeat localization studies as well as DXA    Once PHPT confirmed refer to surgery due to nephrolithiasis, GERD, Symptomatology, young age     Obtain prolactin     Orders Placed This Encounter   Procedures   • NM Parathyroid Scan w SPECT     Order Specific Question:   Reason for Exam:     Answer:   primary hyperparathyroidism     Order Specific Question:   Patient Pregnant     Answer:   No     Order Specific Question:   Is the patient breastfeeding?     Answer:   No   • US Head Neck Soft Tissue     Order Specific Question:   Reason for Exam:     Answer:   primary hyperparathyroidism   • DEXA Bone Density Axial     Order Specific Question:   Reason for Exam:     Answer:   osteoporosis     Order Specific Question:   Patient Pregnant     Answer:   No   • DEXA Bone Density Appendicular     Order Specific Question:   Reason for Exam:     Answer:   primary hyperparathyroidism     Order Specific Question:   Patient Pregnant     Answer:   No   • Calcium, Urine, 24 Hour - Urine, Clean Catch   • Creatinine, Urine, 24 Hour - Urine, Clean Catch   • Vitamin D 25 Hydroxy   •  Renal Function Panel   • TSH   • Prolactin   • Insulin-like Growth Factor         A copy of my note was sent to Isaaih Peter APRN    Please see my above opinion and suggestions.

## 2019-06-20 ENCOUNTER — APPOINTMENT (OUTPATIENT)
Dept: LAB | Facility: HOSPITAL | Age: 34
End: 2019-06-20

## 2019-06-20 LAB
CALCIUM 24H UR-MCNC: 8.1 MG/DL
CALCIUM 24H UR-MRATE: 243 MG/24 HR (ref 100–300)
COLLECT DURATION TIME UR: 24 HRS
COLLECT DURATION TIME UR: 24 HRS
CREAT UR-MCNC: 49.9 MG/DL
CREATINE 24H UR-MRATE: 1.5 G/24 HR (ref 0.7–1.6)
IGF-I SERPL-MCNC: 167 NG/ML (ref 73–243)
SPECIMEN VOL 24H UR: 3000 ML
SPECIMEN VOL 24H UR: 3000 ML

## 2019-06-20 PROCEDURE — 82340 ASSAY OF CALCIUM IN URINE: CPT | Performed by: INTERNAL MEDICINE

## 2019-06-20 PROCEDURE — 81050 URINALYSIS VOLUME MEASURE: CPT | Performed by: INTERNAL MEDICINE

## 2019-06-20 PROCEDURE — 82570 ASSAY OF URINE CREATININE: CPT | Performed by: INTERNAL MEDICINE

## 2019-06-21 ENCOUNTER — HOSPITAL ENCOUNTER (OUTPATIENT)
Dept: NUCLEAR MEDICINE | Facility: HOSPITAL | Age: 34
Discharge: HOME | End: 2019-06-21

## 2019-06-21 ENCOUNTER — HOSPITAL ENCOUNTER (OUTPATIENT)
Dept: ULTRASOUND IMAGING | Facility: HOSPITAL | Age: 34
Discharge: HOME | End: 2019-06-21
Admitting: INTERNAL MEDICINE

## 2019-06-21 PROCEDURE — 0 TECHNETIUM SESTAMIBI: Performed by: INTERNAL MEDICINE

## 2019-06-21 PROCEDURE — 78071 PARATHYRD PLANAR W/WO SUBTRJ: CPT

## 2019-06-21 PROCEDURE — 76536 US EXAM OF HEAD AND NECK: CPT

## 2019-06-21 PROCEDURE — A9500 TC99M SESTAMIBI: HCPCS | Performed by: INTERNAL MEDICINE

## 2019-06-21 RX ADMIN — TECHNETIUM TC 99M SESTAMIBI 1 DOSE: 1 INJECTION INTRAVENOUS at 12:45

## 2019-07-09 ENCOUNTER — OFFICE VISIT (OUTPATIENT)
Dept: ENDOCRINOLOGY | Facility: CLINIC | Age: 34
End: 2019-07-09

## 2019-07-09 VITALS
WEIGHT: 246.5 LBS | OXYGEN SATURATION: 98 % | DIASTOLIC BLOOD PRESSURE: 76 MMHG | SYSTOLIC BLOOD PRESSURE: 130 MMHG | HEIGHT: 68 IN | BODY MASS INDEX: 37.36 KG/M2 | HEART RATE: 125 BPM

## 2019-07-09 DIAGNOSIS — E21.0 PRIMARY HYPERPARATHYROIDISM (HCC): Primary | ICD-10-CM

## 2019-07-09 PROCEDURE — 99214 OFFICE O/P EST MOD 30 MIN: CPT | Performed by: INTERNAL MEDICINE

## 2019-07-11 ENCOUNTER — OFFICE VISIT (OUTPATIENT)
Dept: FAMILY MEDICINE CLINIC | Facility: CLINIC | Age: 34
End: 2019-07-11

## 2019-07-11 VITALS
OXYGEN SATURATION: 98 % | RESPIRATION RATE: 20 BRPM | DIASTOLIC BLOOD PRESSURE: 78 MMHG | HEART RATE: 78 BPM | HEIGHT: 68 IN | SYSTOLIC BLOOD PRESSURE: 120 MMHG | BODY MASS INDEX: 37.72 KG/M2 | WEIGHT: 248.9 LBS

## 2019-07-11 DIAGNOSIS — K21.9 GASTROESOPHAGEAL REFLUX DISEASE WITHOUT ESOPHAGITIS: ICD-10-CM

## 2019-07-11 DIAGNOSIS — F41.8 ANXIETY WITH DEPRESSION: Primary | ICD-10-CM

## 2019-07-11 PROCEDURE — 99214 OFFICE O/P EST MOD 30 MIN: CPT | Performed by: NURSE PRACTITIONER

## 2019-07-11 RX ORDER — VENLAFAXINE HYDROCHLORIDE 75 MG/1
75 CAPSULE, EXTENDED RELEASE ORAL DAILY
Qty: 30 CAPSULE | Refills: 2 | Status: SHIPPED | OUTPATIENT
Start: 2019-07-11 | End: 2019-10-02 | Stop reason: SDUPTHER

## 2019-07-11 RX ORDER — LORATADINE 10 MG/1
10 TABLET ORAL DAILY
Refills: 4 | COMMUNITY
Start: 2019-07-08 | End: 2019-07-16

## 2019-07-11 NOTE — PROGRESS NOTES
Subjective   Niclo Humphrey is a 33 y.o. female.     Ms. Humphrey is a 33-year-old female who presents today for follow-up related anxiety and depression and GERD.  At last visit patient was started on Effexor 37.5 mg p.o. daily.  She reports no worsening of her symptoms however she has sided no improvement in her symptoms either.  However she feels this is secondary to increased family stress related to medical issues with herself and her daughter.  She denies any suicidal or homicidal ideations.  She is taking medication daily as prescribed.  GERD symptoms have slightly improved after seeing GI and being started on Carafate.  She is scheduled for an upcoming EGD.  She has no acute complaints today.         The following portions of the patient's history were reviewed and updated as appropriate: allergies, current medications, past family history, past medical history, past social history, past surgical history and problem list.    Review of Systems   Constitutional: Negative for activity change, appetite change, fatigue, unexpected weight gain and unexpected weight loss.   HENT: Negative for congestion, sore throat, trouble swallowing and voice change.    Eyes: Negative.    Respiratory: Negative for cough, chest tightness, shortness of breath and wheezing.    Cardiovascular: Negative for chest pain, palpitations and leg swelling.   Gastrointestinal: Positive for GERD (improving). Negative for abdominal pain, constipation, diarrhea, nausea and vomiting.   Endocrine: Negative.    Genitourinary: Negative for dysuria.   Musculoskeletal: Negative for arthralgias and myalgias.   Skin: Negative for rash.   Allergic/Immunologic: Negative.    Neurological: Negative.    Hematological: Negative.    Psychiatric/Behavioral: Positive for depressed mood and stress. Negative for suicidal ideas. The patient is nervous/anxious.        Objective   Physical Exam   Constitutional: She is oriented to person, place, and time. She appears  well-developed and well-nourished. No distress.   HENT:   Head: Normocephalic and atraumatic.   Eyes: Conjunctivae are normal.   Neck: Normal range of motion.   Cardiovascular: Normal rate, regular rhythm and normal heart sounds.   Pulmonary/Chest: Effort normal and breath sounds normal. No respiratory distress. She has no wheezes. She has no rales.   Abdominal: Soft. Bowel sounds are normal. She exhibits no distension and no mass. There is no tenderness. There is no rebound and no guarding. No hernia.   Musculoskeletal: Normal range of motion. She exhibits no edema or tenderness.   Neurological: She is alert and oriented to person, place, and time.   Skin: Skin is warm and dry. No rash noted. She is not diaphoretic. No erythema. No pallor.   Psychiatric: She has a normal mood and affect. Her speech is normal and behavior is normal. Judgment and thought content normal. She is not actively hallucinating. Thought content is not paranoid and not delusional. Cognition and memory are normal. She expresses no homicidal and no suicidal ideation. She expresses no suicidal plans and no homicidal plans. She is attentive.   Nursing note and vitals reviewed.        Assessment/Plan   Nicol was seen today for follow-up.    Diagnoses and all orders for this visit:    Anxiety with depression    Gastroesophageal reflux disease without esophagitis    Other orders  -     venlafaxine XR (EFFEXOR-XR) 75 MG 24 hr capsule; Take 1 capsule by mouth Daily for 30 days.    1.  Anxiety and depression-stable but not improving.  Increase Effexor to 75 mg 1 capsule p.o. daily.  Patient instructed to stop medication if symptoms worsen.  Patient instructed to go to the emergency room if she develop suicidal or homicidal ideations.  Patient verbalized understanding of instruction.    2.  GERD- improving.  Continue follow-up with GI as scheduled.    3.  Follow-up in 3 months or sooner if needed.            This document has been electronically  signed by ARIANNA Lo on July 11, 2019 10:35 AM

## 2019-07-15 ENCOUNTER — CONSULT (OUTPATIENT)
Dept: SURGERY | Facility: CLINIC | Age: 34
End: 2019-07-15

## 2019-07-15 VITALS
BODY MASS INDEX: 37.65 KG/M2 | SYSTOLIC BLOOD PRESSURE: 122 MMHG | TEMPERATURE: 98.4 F | HEART RATE: 80 BPM | WEIGHT: 248.4 LBS | DIASTOLIC BLOOD PRESSURE: 78 MMHG | HEIGHT: 68 IN

## 2019-07-15 DIAGNOSIS — E21.3 HYPERPARATHYROIDISM (HCC): Primary | ICD-10-CM

## 2019-07-15 PROCEDURE — 99203 OFFICE O/P NEW LOW 30 MIN: CPT | Performed by: SURGERY

## 2019-07-15 RX ORDER — SODIUM CHLORIDE 9 MG/ML
100 INJECTION, SOLUTION INTRAVENOUS CONTINUOUS
Status: CANCELLED | OUTPATIENT
Start: 2019-07-19

## 2019-07-15 RX ORDER — BUPIVACAINE HCL/0.9 % NACL/PF 0.1 %
2 PLASTIC BAG, INJECTION (ML) EPIDURAL ONCE
Status: CANCELLED | OUTPATIENT
Start: 2019-07-19 | End: 2019-07-15

## 2019-07-15 NOTE — PROGRESS NOTES
Chief Complaint   Patient presents with   • Hyperparathyroidism        HPI  33 year old with hyperparathyroidism. Severity is high given associated nephrolithiasis as well as heartburn.     Ref Range & Units 2mo ago   PTH, Intact 15.0 - 65.0 pg/mL 151.0 Abnormally high     Calcium 8.6 - 10.5 mg/dL 10.6 Abnormally high       Study Result     PROCEDURE: Parathyroid scan with SPECT-CT     CLINICAL HISTORY:  primary hyperparathyroidism, E21.0 Primary  hyperparathyroidism                     COMPARISON EXAMINATIONS: Thyroid ultrasound dated 6/21/2019.   Parathyroid scan dated 2/20/2018.      DOSE:  20.2 mCi of technetium labeled SestaMibi (I.V.)               TECHNIQUE:  Immediate and 90 minute delayed SPECT-CT imaging of  the neck and upper thorax.     Note: The CT portion of the exam is of nondiagnostic quality and  was used for attenuation correction and anatomical correlation.                    FINDINGS:            The evaluation of the immediate images demonstrate:      Physiologic uptake of radiotracer in the salivary glands and  thyroid gland.                    The evaluation of the delayed images demonstrate:    Physiologic uptake of radiotracer in the salivary glands and none  in the region of the thyroid gland.            IMPRESSION:  CONCLUSION:    No scintigraphic evidence of a functioning parathyroid adenoma.     Electronically signed by:  Donnell Ahumada MD  6/24/2019 11:12 AM  CDT Workstation: [id number]     Study Result        THYROID ULTRASOUND     HISTORY: Primary hyperparathyroidism     The thyroid was scanned with a small parts transducer.     COMPARISON: None     The right lobe measures 4.95 cm in length by 1.59 cm AP by 2.03  cm transverse.  The left lobe measures 4.71 cm in length by 1.29 cm AP by 1.40 cm  transverse.  Unremarkable isthmus.     Thyroid of uniform echotexture.  No focal nodule or mass.  No parathyroid adenoma identified.     IMPRESSION:  CONCLUSION:  Normal  study.     [id number]     Electronically signed by:  Roberto Quinteros MD  2019 5:39 PM CDT  Workstation: [id number]         Past Medical History:   Diagnosis Date   • Anxiety disorder     Anxiety disorder, unspecified      • Bronchitis    • Cholelithiasis    • Depression    • Encounter for  visit      visit status      • GERD (gastroesophageal reflux disease)    • Influenza    • Kidney stone    • Pediculosis due to pediculus humanus capitis    • Rectocele complicating  care, baby not yet delivered     Routine  care      • Strep throat    • Supervision of other normal pregnancy    • Surgical follow-up care    • Transient hypertension of pregnancy     Transient hypertension of pregnancy - not delivered      • Upper respiratory infection    • Urinary tract infection    • Viral gastroenteritis        Past Surgical History:   Procedure Laterality Date   • ADENOIDECTOMY     •  SECTION      Repeat low transverse  with left partial salpingectomy. 2014       • CHOLECYSTECTOMY     • ECTOPIC PREGNANCY       Operative laparoscopy, right salpinectomy and removal of ectopic pregnancy. Right ectopic pregnancy. 2008       • INJECTION OF MEDICATION      B12 (1)      2010       • PAP SMEAR      Negative for intraepithelial lesion or malignancy. 2009       • TONSILLECTOMY     • TONSILLECTOMY AND ADENOIDECTOMY      Tonsillectomy and adenoidectomy. Chronic tonsillitis. 2000       • TUBAL ABDOMINAL LIGATION           Current Outpatient Medications:   •  busPIRone (BUSPAR) 10 MG tablet, TAKE 1 TABLET BY MOUTH 3 TIMES A DAY FOR 30 DAYS, Disp: 90 tablet, Rfl: 1  •  hydrOXYzine (ATARAX) 25 MG tablet, Take 1 tablet by mouth At Night As Needed (sleep)., Disp: 30 tablet, Rfl: 3  •  sucralfate (CARAFATE) 1 GM/10ML suspension, Take 10 mL by mouth 4 (Four) Times a Day With Meals & at Bedtime., Disp: 1260 mL, Rfl: 3  •  venlafaxine XR (EFFEXOR-XR) 75 MG 24 hr capsule, Take  1 capsule by mouth Daily for 30 days., Disp: 30 capsule, Rfl: 2  •  vitamin D (ERGOCALCIFEROL) 36054 units capsule capsule, Take 1 capsule by mouth 1 (One) Time Per Week., Disp: 4 capsule, Rfl: 3  •  loratadine (CLARITIN) 10 MG tablet, Take 10 mg by mouth Daily., Disp: , Rfl: 4    No Known Allergies    Family History   Problem Relation Age of Onset   • Diabetes Other    • Thyroid disease Other         Grandfather and aunt    • Cervical cancer Mother    • Thyroid disease Mother    • Cancer Mother    • Cholelithiasis Mother    • No Known Problems Father    • Lung disease Daughter    • Kidney disease Daughter    • Heart disease Daughter    • No Known Problems Son    • Throat cancer Maternal Grandmother    • Cancer Maternal Grandmother    • Lung cancer Maternal Grandfather    • Cancer Maternal Grandfather    • Heart attack Paternal Grandmother    • Stroke Paternal Grandmother    • Stomach cancer Paternal Grandfather    • Cancer Paternal Grandfather    • No Known Problems Daughter    • No Known Problems Daughter        Social History     Socioeconomic History   • Marital status:      Spouse name: Not on file   • Number of children: Not on file   • Years of education: Not on file   • Highest education level: Not on file   Tobacco Use   • Smoking status: Current Every Day Smoker     Packs/day: 1.00     Types: Cigarettes     Last attempt to quit: 1/3/2018     Years since quittin.5   • Smokeless tobacco: Never Used   Substance and Sexual Activity   • Alcohol use: No   • Drug use: No   • Sexual activity: Yes     Partners: Male     Birth control/protection: Surgical       Review of Systems   Constitutional: Negative.  Negative for appetite change, chills, fever and unexpected weight change.   HENT: Negative.  Negative for hearing loss, nosebleeds and trouble swallowing.    Eyes: Negative.  Negative for visual disturbance.   Respiratory: Negative.  Negative for apnea, cough, choking, chest tightness, shortness of  breath, wheezing and stridor.    Cardiovascular: Negative.  Negative for chest pain, palpitations and leg swelling.   Gastrointestinal: Negative for abdominal distention, abdominal pain, blood in stool, constipation, diarrhea, nausea and vomiting.        Heartburn.   Endocrine: Negative.  Negative for cold intolerance, heat intolerance, polydipsia, polyphagia and polyuria.   Genitourinary: Negative.  Negative for difficulty urinating, dysuria, frequency, hematuria and urgency.   Musculoskeletal: Negative.  Negative for arthralgias, back pain, myalgias and neck pain.   Skin: Negative.  Negative for color change, pallor and rash.   Allergic/Immunologic: Negative.  Negative for immunocompromised state.   Neurological: Negative.  Negative for dizziness, seizures, syncope, light-headedness, numbness and headaches.   Hematological: Negative.  Negative for adenopathy.   Psychiatric/Behavioral: Negative for suicidal ideas. The patient is not nervous/anxious.         Mood changes.       Physical Exam   Constitutional: She is oriented to person, place, and time. She appears well-developed and well-nourished. No distress.   HENT:   Head: Normocephalic and atraumatic.   Mouth/Throat: No oropharyngeal exudate.   Eyes: EOM are normal. Pupils are equal, round, and reactive to light. No scleral icterus.   Neck: Normal range of motion. Neck supple. No JVD present. No tracheal deviation present. No thyromegaly present.   Cardiovascular: Normal rate, regular rhythm and normal heart sounds.   Pulmonary/Chest: Effort normal and breath sounds normal. No stridor. No respiratory distress. She has no wheezes. She has no rales.   Abdominal: Soft. Bowel sounds are normal. She exhibits no distension and no mass. There is no tenderness. There is no rebound and no guarding. No hernia.   Musculoskeletal: Normal range of motion. She exhibits no edema, tenderness or deformity.   Lymphadenopathy:     She has no cervical adenopathy.     She has no  axillary adenopathy.   Neurological: She is alert and oriented to person, place, and time.   Skin: Skin is warm and dry. No rash noted. She is not diaphoretic. No erythema. No pallor.   Psychiatric: She has a normal mood and affect. Her behavior is normal. Judgment normal.   Vitals reviewed.        ASSESSMENT    Nicol was seen today for hyperparathyroidism.    Diagnoses and all orders for this visit:    Hyperparathyroidism (CMS/[facility])  -     Case Request; Standing  -     sodium chloride 0.9 % infusion  -     ceFAZolin (ANCEF) 2 g in sodium chloride 0.9 % 100 mL IVPB  -     Case Request    Other orders  -     Follow Anesthesia Guidelines / Standing Orders; Future  -     Follow Anesthesia Guidelines / Standing Orders; Standing  -     Provide instructions to patient on NPO status; Future  -     Chlorhexidine Skin Prep; Future  -     Obtain Informed Consent; Standing  -     Verify / Perform Chlorhexidine skin prep If Indicated; Standing        PLAN    1. Parathyroidectomy is planned      The following were discussed:      What are the indications that have led your doctor to the opinion that an operation is necessary?      It is explained that there are four parathyroid glands in the neck located behind the thyroid gland. They are very small (each the size of a small pea) and they control how much calcium is in the body. Most of the time, the parathyroid glands work perfectly. In this case, one or more of the parathyroid glands have become overactive. This causes the body to have too much calcium in the bloodstream, a condition called primary hyperparathyroidism. This has been detected by a blood test to determine the level of your body’s calcium and parathyroid hormone (also referred to as PTH). While calcium is important to one's health, too much calcium can produce several unwanted symptoms. These can include muscle and bone pain, osteoporosis, fatigue, weakness, kidney stones, and depression.   Usually, the cause  of primary hyperparathyroidism is a benign (non-cancerous) tumor in one of the parathyroid glands. This tumor is called a parathyroid adenoma. There are other less common causes of excessive calcium as well. Sometimes, all four glands become overactive, a condition called parathyroid hyperplasia.     What, if any, alternative treatments are available for your condition?    While there is medical treatment available for hyperparathyroidism, is is usually reserved for patients with renal failure or those who either cannot undergo or fail surgical management.    What will be the likely result if you don't have the operation?    Symptoms, including muscle and bone pain, osteoporosis, fatigue, weakness, kidney stones, and depression may occur. While may patients remain asymptomatic, the risk of developing symptoms is unpredictable and, therefore, surgery is suggested.     What are the basic procedures involved in the operation?    Surgery is done through an incision in the neck. After removing the abnormal parathyroid gland(s), your calcium and PTH levels will return to normal and symptoms will soon resolve. Blood tests in surgery are used to confirm successful treatment of the disease.    What are the risks?    Risks of surgery include but are not limited to:    BLEEDING: Minor bleeding from the incision is typically not a problem; however, heavy bleeding deeper in the neck can be very serious and can potentially cause difficulty with breathing. If not addressed in a timely fashion it can possibly cause suffocation.    HYPO?PARATHYROIDISM OR LOW BLOOD CALCIUM: This is a problem that can occur if the entire thyroid gland was removed or if you have had surgery for hyperparathyroidism. You may or may not have been prescribed calcium and vitamin D after surgery. If you develop tingling of your lips, fingers or toes or if your muscles feel spastic or cramping, notify us immediately so this can be also managed in a timely  fashion. If this occurs, it is usually temporary, but in rare instances, low blood calcium may be permanent.     HOARSENESS: Parathyroid surgery involves dissection around the nerves that control the vocal cords. The vocal cord muscles rotate out to an open position while taking a breath of air and are rotated in and squeezed together in the midline in order to create voice. Each vocal cord has two nerves that control its movement. The larger nerve travels up from below the vocal cords and is named the recurrent laryngeal nerve. It is responsible for the majority of the movement of the vocal cord. A smaller nerve travels down from above the voice box to control a muscle on the outside of the voice box (larynx). It is named the superior laryngeal nerve, and it helps in fine-tuning the voice allowing for control of your pitch and your ability to project the voice. Surgery to the parathyroid glands places all these nerves at risk for injury. One-sided surgery places the upper and lower nerves on that side at risk for injury. Surgery to both sides places all 4 nerves at risk for injury. Permanent damage to these nerves is rare. Temporary injury to these nerves occurs on occasion. The symptoms of laryngeal nerve injury depend on which nerve(s) is affected and can be as minor as mild hoarseness or can be more serious as severe hoarseness. Extremely rarely, if both recurrent laryngeal nerves were to be injured, this could lead to difficulty in breathing in air, necessitating emergency airway control by tracheotomy    INFECTION: Infection after parathyroidectomy is very rare.     POSSIBLE NEED FOR FURTHER TREATMENT AND/OR SURGERY: Depending on the problem for which the surgery was performed, as well as the result of the specimens that were sent to the laboratory for pathologic study, sometimes further surgical therapy or further medical therapy is needed.     SCARRING: While we strive and normally achieve excellent scar  camouflage, occasionally there is a poor cosmetic result.. The scar is typically placed in a previous existing neck crease. Scars are typically raised, somewhat red and bumpy for three months, but over the next year or so, the scar softens, matures and becomes much less visible. Very rarely poor scarring can occur. In these rare instances, scar revision can be performed.     NON?RESOLUTION OF THE PROBLEM: Depending on the condition that necessitated the surgery, the outcomes may vary. With a single tumor, the cure rate approaches 100%. On occasion, a second tumor might become evident over time, and additional surgeries might be needed. With enlargement of all of the glands, there is up to a 15% chance that the first surgery is not curative, and further surgeries might be necessary.     As with any type of surgery, the risks of anesthesia such as drug reaction, breathing difficulties and even death are possible.       How is the operation expected to improve your health or quality of life?    Is hospitalization necessary and, if so, how long can you expect to be hospitalized?    Patients who undergo parathyroid surgery are often discharged home from the hospital the same day. Narcotic and non-narcotic pain medicine is usually prescribed to be taken on an as needed basis.     What can you expect during your recovery period?    In most cases, primary hyperparathyroidism is cured with surgery. This decreases the risks associated with calcium levels that have become too high.    When can you expect to resume normal activities?    Normal activity can be resumed in 2-3 weeks.    Are there likely to be residual effects from the operation?    There are usually no long term residual effects of parathyroid surgery.    Parathyroidectomy is explained with the risks and benefits of surgery.      All questions are answered. She understands and agrees to operation.              This document has been electronically signed by Kelvin  VLADIMIR Yee MD on July 15, 2019 10:30 AM

## 2019-07-15 NOTE — PATIENT INSTRUCTIONS

## 2019-07-15 NOTE — H&P (VIEW-ONLY)
Chief Complaint   Patient presents with   • Hyperparathyroidism        HPI  33 year old with hyperparathyroidism. Severity is high given associated nephrolithiasis as well as heartburn.     Ref Range & Units 2mo ago   PTH, Intact 15.0 - 65.0 pg/mL 151.0 Abnormally high     Calcium 8.6 - 10.5 mg/dL 10.6 Abnormally high       Study Result     PROCEDURE: Parathyroid scan with SPECT-CT     CLINICAL HISTORY:  primary hyperparathyroidism, E21.0 Primary  hyperparathyroidism                     COMPARISON EXAMINATIONS: Thyroid ultrasound dated 6/21/2019.   Parathyroid scan dated 2/20/2018.      DOSE:  20.2 mCi of technetium labeled SestaMibi (I.V.)               TECHNIQUE:  Immediate and 90 minute delayed SPECT-CT imaging of  the neck and upper thorax.     Note: The CT portion of the exam is of nondiagnostic quality and  was used for attenuation correction and anatomical correlation.                    FINDINGS:            The evaluation of the immediate images demonstrate:      Physiologic uptake of radiotracer in the salivary glands and  thyroid gland.                    The evaluation of the delayed images demonstrate:    Physiologic uptake of radiotracer in the salivary glands and none  in the region of the thyroid gland.            IMPRESSION:  CONCLUSION:    No scintigraphic evidence of a functioning parathyroid adenoma.     Electronically signed by:  Donnell Ahumada MD  6/24/2019 11:12 AM  CDT Workstation: [id number]     Study Result        THYROID ULTRASOUND     HISTORY: Primary hyperparathyroidism     The thyroid was scanned with a small parts transducer.     COMPARISON: None     The right lobe measures 4.95 cm in length by 1.59 cm AP by 2.03  cm transverse.  The left lobe measures 4.71 cm in length by 1.29 cm AP by 1.40 cm  transverse.  Unremarkable isthmus.     Thyroid of uniform echotexture.  No focal nodule or mass.  No parathyroid adenoma identified.     IMPRESSION:  CONCLUSION:  Normal  study.     [id number]     Electronically signed by:  Roberto Quinteros MD  2019 5:39 PM CDT  Workstation: [id number]         Past Medical History:   Diagnosis Date   • Anxiety disorder     Anxiety disorder, unspecified      • Bronchitis    • Cholelithiasis    • Depression    • Encounter for  visit      visit status      • GERD (gastroesophageal reflux disease)    • Influenza    • Kidney stone    • Pediculosis due to pediculus humanus capitis    • Rectocele complicating  care, baby not yet delivered     Routine  care      • Strep throat    • Supervision of other normal pregnancy    • Surgical follow-up care    • Transient hypertension of pregnancy     Transient hypertension of pregnancy - not delivered      • Upper respiratory infection    • Urinary tract infection    • Viral gastroenteritis        Past Surgical History:   Procedure Laterality Date   • ADENOIDECTOMY     •  SECTION      Repeat low transverse  with left partial salpingectomy. 2014       • CHOLECYSTECTOMY     • ECTOPIC PREGNANCY       Operative laparoscopy, right salpinectomy and removal of ectopic pregnancy. Right ectopic pregnancy. 2008       • INJECTION OF MEDICATION      B12 (1)      2010       • PAP SMEAR      Negative for intraepithelial lesion or malignancy. 2009       • TONSILLECTOMY     • TONSILLECTOMY AND ADENOIDECTOMY      Tonsillectomy and adenoidectomy. Chronic tonsillitis. 2000       • TUBAL ABDOMINAL LIGATION           Current Outpatient Medications:   •  busPIRone (BUSPAR) 10 MG tablet, TAKE 1 TABLET BY MOUTH 3 TIMES A DAY FOR 30 DAYS, Disp: 90 tablet, Rfl: 1  •  hydrOXYzine (ATARAX) 25 MG tablet, Take 1 tablet by mouth At Night As Needed (sleep)., Disp: 30 tablet, Rfl: 3  •  sucralfate (CARAFATE) 1 GM/10ML suspension, Take 10 mL by mouth 4 (Four) Times a Day With Meals & at Bedtime., Disp: 1260 mL, Rfl: 3  •  venlafaxine XR (EFFEXOR-XR) 75 MG 24 hr capsule, Take  1 capsule by mouth Daily for 30 days., Disp: 30 capsule, Rfl: 2  •  vitamin D (ERGOCALCIFEROL) 00380 units capsule capsule, Take 1 capsule by mouth 1 (One) Time Per Week., Disp: 4 capsule, Rfl: 3  •  loratadine (CLARITIN) 10 MG tablet, Take 10 mg by mouth Daily., Disp: , Rfl: 4    No Known Allergies    Family History   Problem Relation Age of Onset   • Diabetes Other    • Thyroid disease Other         Grandfather and aunt    • Cervical cancer Mother    • Thyroid disease Mother    • Cancer Mother    • Cholelithiasis Mother    • No Known Problems Father    • Lung disease Daughter    • Kidney disease Daughter    • Heart disease Daughter    • No Known Problems Son    • Throat cancer Maternal Grandmother    • Cancer Maternal Grandmother    • Lung cancer Maternal Grandfather    • Cancer Maternal Grandfather    • Heart attack Paternal Grandmother    • Stroke Paternal Grandmother    • Stomach cancer Paternal Grandfather    • Cancer Paternal Grandfather    • No Known Problems Daughter    • No Known Problems Daughter        Social History     Socioeconomic History   • Marital status:      Spouse name: Not on file   • Number of children: Not on file   • Years of education: Not on file   • Highest education level: Not on file   Tobacco Use   • Smoking status: Current Every Day Smoker     Packs/day: 1.00     Types: Cigarettes     Last attempt to quit: 1/3/2018     Years since quittin.5   • Smokeless tobacco: Never Used   Substance and Sexual Activity   • Alcohol use: No   • Drug use: No   • Sexual activity: Yes     Partners: Male     Birth control/protection: Surgical       Review of Systems   Constitutional: Negative.  Negative for appetite change, chills, fever and unexpected weight change.   HENT: Negative.  Negative for hearing loss, nosebleeds and trouble swallowing.    Eyes: Negative.  Negative for visual disturbance.   Respiratory: Negative.  Negative for apnea, cough, choking, chest tightness, shortness of  breath, wheezing and stridor.    Cardiovascular: Negative.  Negative for chest pain, palpitations and leg swelling.   Gastrointestinal: Negative for abdominal distention, abdominal pain, blood in stool, constipation, diarrhea, nausea and vomiting.        Heartburn.   Endocrine: Negative.  Negative for cold intolerance, heat intolerance, polydipsia, polyphagia and polyuria.   Genitourinary: Negative.  Negative for difficulty urinating, dysuria, frequency, hematuria and urgency.   Musculoskeletal: Negative.  Negative for arthralgias, back pain, myalgias and neck pain.   Skin: Negative.  Negative for color change, pallor and rash.   Allergic/Immunologic: Negative.  Negative for immunocompromised state.   Neurological: Negative.  Negative for dizziness, seizures, syncope, light-headedness, numbness and headaches.   Hematological: Negative.  Negative for adenopathy.   Psychiatric/Behavioral: Negative for suicidal ideas. The patient is not nervous/anxious.         Mood changes.       Physical Exam   Constitutional: She is oriented to person, place, and time. She appears well-developed and well-nourished. No distress.   HENT:   Head: Normocephalic and atraumatic.   Mouth/Throat: No oropharyngeal exudate.   Eyes: EOM are normal. Pupils are equal, round, and reactive to light. No scleral icterus.   Neck: Normal range of motion. Neck supple. No JVD present. No tracheal deviation present. No thyromegaly present.   Cardiovascular: Normal rate, regular rhythm and normal heart sounds.   Pulmonary/Chest: Effort normal and breath sounds normal. No stridor. No respiratory distress. She has no wheezes. She has no rales.   Abdominal: Soft. Bowel sounds are normal. She exhibits no distension and no mass. There is no tenderness. There is no rebound and no guarding. No hernia.   Musculoskeletal: Normal range of motion. She exhibits no edema, tenderness or deformity.   Lymphadenopathy:     She has no cervical adenopathy.     She has no  axillary adenopathy.   Neurological: She is alert and oriented to person, place, and time.   Skin: Skin is warm and dry. No rash noted. She is not diaphoretic. No erythema. No pallor.   Psychiatric: She has a normal mood and affect. Her behavior is normal. Judgment normal.   Vitals reviewed.        ASSESSMENT    Nicol was seen today for hyperparathyroidism.    Diagnoses and all orders for this visit:    Hyperparathyroidism (CMS/[facility])  -     Case Request; Standing  -     sodium chloride 0.9 % infusion  -     ceFAZolin (ANCEF) 2 g in sodium chloride 0.9 % 100 mL IVPB  -     Case Request    Other orders  -     Follow Anesthesia Guidelines / Standing Orders; Future  -     Follow Anesthesia Guidelines / Standing Orders; Standing  -     Provide instructions to patient on NPO status; Future  -     Chlorhexidine Skin Prep; Future  -     Obtain Informed Consent; Standing  -     Verify / Perform Chlorhexidine skin prep If Indicated; Standing        PLAN    1. Parathyroidectomy is planned      The following were discussed:      What are the indications that have led your doctor to the opinion that an operation is necessary?      It is explained that there are four parathyroid glands in the neck located behind the thyroid gland. They are very small (each the size of a small pea) and they control how much calcium is in the body. Most of the time, the parathyroid glands work perfectly. In this case, one or more of the parathyroid glands have become overactive. This causes the body to have too much calcium in the bloodstream, a condition called primary hyperparathyroidism. This has been detected by a blood test to determine the level of your body’s calcium and parathyroid hormone (also referred to as PTH). While calcium is important to one's health, too much calcium can produce several unwanted symptoms. These can include muscle and bone pain, osteoporosis, fatigue, weakness, kidney stones, and depression.   Usually, the cause  of primary hyperparathyroidism is a benign (non-cancerous) tumor in one of the parathyroid glands. This tumor is called a parathyroid adenoma. There are other less common causes of excessive calcium as well. Sometimes, all four glands become overactive, a condition called parathyroid hyperplasia.     What, if any, alternative treatments are available for your condition?    While there is medical treatment available for hyperparathyroidism, is is usually reserved for patients with renal failure or those who either cannot undergo or fail surgical management.    What will be the likely result if you don't have the operation?    Symptoms, including muscle and bone pain, osteoporosis, fatigue, weakness, kidney stones, and depression may occur. While may patients remain asymptomatic, the risk of developing symptoms is unpredictable and, therefore, surgery is suggested.     What are the basic procedures involved in the operation?    Surgery is done through an incision in the neck. After removing the abnormal parathyroid gland(s), your calcium and PTH levels will return to normal and symptoms will soon resolve. Blood tests in surgery are used to confirm successful treatment of the disease.    What are the risks?    Risks of surgery include but are not limited to:    BLEEDING: Minor bleeding from the incision is typically not a problem; however, heavy bleeding deeper in the neck can be very serious and can potentially cause difficulty with breathing. If not addressed in a timely fashion it can possibly cause suffocation.    HYPO?PARATHYROIDISM OR LOW BLOOD CALCIUM: This is a problem that can occur if the entire thyroid gland was removed or if you have had surgery for hyperparathyroidism. You may or may not have been prescribed calcium and vitamin D after surgery. If you develop tingling of your lips, fingers or toes or if your muscles feel spastic or cramping, notify us immediately so this can be also managed in a timely  fashion. If this occurs, it is usually temporary, but in rare instances, low blood calcium may be permanent.     HOARSENESS: Parathyroid surgery involves dissection around the nerves that control the vocal cords. The vocal cord muscles rotate out to an open position while taking a breath of air and are rotated in and squeezed together in the midline in order to create voice. Each vocal cord has two nerves that control its movement. The larger nerve travels up from below the vocal cords and is named the recurrent laryngeal nerve. It is responsible for the majority of the movement of the vocal cord. A smaller nerve travels down from above the voice box to control a muscle on the outside of the voice box (larynx). It is named the superior laryngeal nerve, and it helps in fine-tuning the voice allowing for control of your pitch and your ability to project the voice. Surgery to the parathyroid glands places all these nerves at risk for injury. One-sided surgery places the upper and lower nerves on that side at risk for injury. Surgery to both sides places all 4 nerves at risk for injury. Permanent damage to these nerves is rare. Temporary injury to these nerves occurs on occasion. The symptoms of laryngeal nerve injury depend on which nerve(s) is affected and can be as minor as mild hoarseness or can be more serious as severe hoarseness. Extremely rarely, if both recurrent laryngeal nerves were to be injured, this could lead to difficulty in breathing in air, necessitating emergency airway control by tracheotomy    INFECTION: Infection after parathyroidectomy is very rare.     POSSIBLE NEED FOR FURTHER TREATMENT AND/OR SURGERY: Depending on the problem for which the surgery was performed, as well as the result of the specimens that were sent to the laboratory for pathologic study, sometimes further surgical therapy or further medical therapy is needed.     SCARRING: While we strive and normally achieve excellent scar  camouflage, occasionally there is a poor cosmetic result.. The scar is typically placed in a previous existing neck crease. Scars are typically raised, somewhat red and bumpy for three months, but over the next year or so, the scar softens, matures and becomes much less visible. Very rarely poor scarring can occur. In these rare instances, scar revision can be performed.     NON?RESOLUTION OF THE PROBLEM: Depending on the condition that necessitated the surgery, the outcomes may vary. With a single tumor, the cure rate approaches 100%. On occasion, a second tumor might become evident over time, and additional surgeries might be needed. With enlargement of all of the glands, there is up to a 15% chance that the first surgery is not curative, and further surgeries might be necessary.     As with any type of surgery, the risks of anesthesia such as drug reaction, breathing difficulties and even death are possible.       How is the operation expected to improve your health or quality of life?    Is hospitalization necessary and, if so, how long can you expect to be hospitalized?    Patients who undergo parathyroid surgery are often discharged home from the hospital the same day. Narcotic and non-narcotic pain medicine is usually prescribed to be taken on an as needed basis.     What can you expect during your recovery period?    In most cases, primary hyperparathyroidism is cured with surgery. This decreases the risks associated with calcium levels that have become too high.    When can you expect to resume normal activities?    Normal activity can be resumed in 2-3 weeks.    Are there likely to be residual effects from the operation?    There are usually no long term residual effects of parathyroid surgery.    Parathyroidectomy is explained with the risks and benefits of surgery.      All questions are answered. She understands and agrees to operation.              This document has been electronically signed by Kelvin  VLADIMIR Yee MD on July 15, 2019 10:30 AM

## 2019-07-16 ENCOUNTER — APPOINTMENT (OUTPATIENT)
Dept: PREADMISSION TESTING | Facility: HOSPITAL | Age: 34
End: 2019-07-16

## 2019-07-16 VITALS
HEART RATE: 96 BPM | SYSTOLIC BLOOD PRESSURE: 148 MMHG | WEIGHT: 248 LBS | HEIGHT: 67 IN | BODY MASS INDEX: 38.92 KG/M2 | DIASTOLIC BLOOD PRESSURE: 78 MMHG | OXYGEN SATURATION: 97 % | RESPIRATION RATE: 14 BRPM

## 2019-07-16 RX ORDER — BUSPIRONE HYDROCHLORIDE 10 MG/1
10 TABLET ORAL 3 TIMES DAILY
COMMUNITY
End: 2019-07-29 | Stop reason: SDUPTHER

## 2019-07-16 NOTE — PAT
Chlorhexidine given with written and verbal instructions, patient relates that she understands information given and has no questions.  Opioid pain medication pamphlet given.

## 2019-07-18 ENCOUNTER — ANESTHESIA EVENT (OUTPATIENT)
Dept: PERIOP | Facility: HOSPITAL | Age: 34
End: 2019-07-18

## 2019-07-19 ENCOUNTER — HOSPITAL ENCOUNTER (OUTPATIENT)
Facility: HOSPITAL | Age: 34
Setting detail: HOSPITAL OUTPATIENT SURGERY
Discharge: HOME | End: 2019-07-19
Attending: SURGERY | Admitting: SURGERY

## 2019-07-19 ENCOUNTER — ANESTHESIA (OUTPATIENT)
Dept: PERIOP | Facility: HOSPITAL | Age: 34
End: 2019-07-19

## 2019-07-19 VITALS
RESPIRATION RATE: 18 BRPM | OXYGEN SATURATION: 96 % | HEART RATE: 91 BPM | TEMPERATURE: 97 F | HEIGHT: 67 IN | WEIGHT: 242.51 LBS | BODY MASS INDEX: 38.06 KG/M2 | SYSTOLIC BLOOD PRESSURE: 131 MMHG | DIASTOLIC BLOOD PRESSURE: 58 MMHG

## 2019-07-19 DIAGNOSIS — E21.3 HYPERPARATHYROIDISM (HCC): ICD-10-CM

## 2019-07-19 LAB
CALCIUM SPEC-SCNC: 9.3 MG/DL (ref 8.6–10.5)
CALCIUM SPEC-SCNC: 9.6 MG/DL (ref 8.6–10.5)
CALCIUM SPEC-SCNC: 9.8 MG/DL (ref 8.6–10.5)
CALCIUM SPEC-SCNC: 9.8 MG/DL (ref 8.6–10.5)
HOLD SPECIMEN: NORMAL
PHOSPHATE SERPL-MCNC: 2.9 MG/DL (ref 2.5–4.5)
PTH-INTACT SERPL-MCNC: 12.3 PG/ML (ref 15–65)
PTH-INTACT SERPL-MCNC: 22.1 PG/ML (ref 15–65)
PTH-INTACT SERPL-MCNC: 249.2 PG/ML (ref 15–65)
PTH-INTACT SERPL-SCNC: 127.2 PG/ML (ref 15–65)

## 2019-07-19 PROCEDURE — 25010000002 HYDROMORPHONE 1 MG/ML SOLUTION: Performed by: NURSE ANESTHETIST, CERTIFIED REGISTERED

## 2019-07-19 PROCEDURE — 88331 PATH CONSLTJ SURG 1 BLK 1SPC: CPT | Performed by: SURGERY

## 2019-07-19 PROCEDURE — 60500 EXPLORE PARATHYROID GLANDS: CPT | Performed by: SURGERY

## 2019-07-19 PROCEDURE — 25010000002 DEXAMETHASONE PER 1 MG: Performed by: NURSE ANESTHETIST, CERTIFIED REGISTERED

## 2019-07-19 PROCEDURE — 82310 ASSAY OF CALCIUM: CPT | Performed by: SURGERY

## 2019-07-19 PROCEDURE — 83970 ASSAY OF PARATHORMONE: CPT | Performed by: SURGERY

## 2019-07-19 PROCEDURE — 25010000002 MIDAZOLAM PER 1 MG: Performed by: NURSE ANESTHETIST, CERTIFIED REGISTERED

## 2019-07-19 PROCEDURE — 25010000002 PROPOFOL 10 MG/ML EMULSION: Performed by: NURSE ANESTHETIST, CERTIFIED REGISTERED

## 2019-07-19 PROCEDURE — 88331 PATH CONSLTJ SURG 1 BLK 1SPC: CPT | Performed by: PATHOLOGY

## 2019-07-19 PROCEDURE — 88305 TISSUE EXAM BY PATHOLOGIST: CPT | Performed by: SURGERY

## 2019-07-19 PROCEDURE — 25010000002 HYDROMORPHONE PER 4 MG: Performed by: NURSE ANESTHETIST, CERTIFIED REGISTERED

## 2019-07-19 PROCEDURE — 84100 ASSAY OF PHOSPHORUS: CPT | Performed by: SURGERY

## 2019-07-19 PROCEDURE — 88305 TISSUE EXAM BY PATHOLOGIST: CPT | Performed by: PATHOLOGY

## 2019-07-19 RX ORDER — SODIUM CHLORIDE 9 MG/ML
100 INJECTION, SOLUTION INTRAVENOUS CONTINUOUS
Status: DISCONTINUED | OUTPATIENT
Start: 2019-07-19 | End: 2019-07-19 | Stop reason: HOSPADM

## 2019-07-19 RX ORDER — CALCIUM CARBONATE 200(500)MG
1 TABLET,CHEWABLE ORAL 2 TIMES DAILY
Qty: 90 TABLET | Refills: 3 | Status: SHIPPED | OUTPATIENT
Start: 2019-07-19 | End: 2020-06-11

## 2019-07-19 RX ORDER — ONDANSETRON 2 MG/ML
4 INJECTION INTRAMUSCULAR; INTRAVENOUS ONCE AS NEEDED
Status: DISCONTINUED | OUTPATIENT
Start: 2019-07-19 | End: 2019-07-19 | Stop reason: HOSPADM

## 2019-07-19 RX ORDER — ALBUTEROL SULFATE 2.5 MG/3ML
2.5 SOLUTION RESPIRATORY (INHALATION) ONCE
Status: COMPLETED | OUTPATIENT
Start: 2019-07-19 | End: 2019-07-19

## 2019-07-19 RX ORDER — LIDOCAINE HYDROCHLORIDE 20 MG/ML
INJECTION, SOLUTION INFILTRATION; PERINEURAL AS NEEDED
Status: DISCONTINUED | OUTPATIENT
Start: 2019-07-19 | End: 2019-07-19 | Stop reason: SURG

## 2019-07-19 RX ORDER — SODIUM CHLORIDE, SODIUM GLUCONATE, SODIUM ACETATE, POTASSIUM CHLORIDE, AND MAGNESIUM CHLORIDE 526; 502; 368; 37; 30 MG/100ML; MG/100ML; MG/100ML; MG/100ML; MG/100ML
INJECTION, SOLUTION INTRAVENOUS CONTINUOUS PRN
Status: DISCONTINUED | OUTPATIENT
Start: 2019-07-19 | End: 2019-07-19 | Stop reason: SURG

## 2019-07-19 RX ORDER — MIDAZOLAM HYDROCHLORIDE 1 MG/ML
INJECTION INTRAMUSCULAR; INTRAVENOUS AS NEEDED
Status: DISCONTINUED | OUTPATIENT
Start: 2019-07-19 | End: 2019-07-19 | Stop reason: SURG

## 2019-07-19 RX ORDER — BUPIVACAINE HCL/0.9 % NACL/PF 0.1 %
2 PLASTIC BAG, INJECTION (ML) EPIDURAL ONCE
Status: COMPLETED | OUTPATIENT
Start: 2019-07-19 | End: 2019-07-19

## 2019-07-19 RX ORDER — ROCURONIUM BROMIDE 10 MG/ML
INJECTION, SOLUTION INTRAVENOUS AS NEEDED
Status: DISCONTINUED | OUTPATIENT
Start: 2019-07-19 | End: 2019-07-19 | Stop reason: SURG

## 2019-07-19 RX ORDER — PROPOFOL 10 MG/ML
VIAL (ML) INTRAVENOUS AS NEEDED
Status: DISCONTINUED | OUTPATIENT
Start: 2019-07-19 | End: 2019-07-19 | Stop reason: SURG

## 2019-07-19 RX ORDER — HYDROMORPHONE HCL 110MG/55ML
PATIENT CONTROLLED ANALGESIA SYRINGE INTRAVENOUS AS NEEDED
Status: DISCONTINUED | OUTPATIENT
Start: 2019-07-19 | End: 2019-07-19 | Stop reason: SURG

## 2019-07-19 RX ORDER — DEXAMETHASONE SODIUM PHOSPHATE 4 MG/ML
INJECTION, SOLUTION INTRA-ARTICULAR; INTRALESIONAL; INTRAMUSCULAR; INTRAVENOUS; SOFT TISSUE AS NEEDED
Status: DISCONTINUED | OUTPATIENT
Start: 2019-07-19 | End: 2019-07-19 | Stop reason: SURG

## 2019-07-19 RX ORDER — HYDROCODONE BITARTRATE AND ACETAMINOPHEN 7.5; 325 MG/1; MG/1
1 TABLET ORAL EVERY 4 HOURS PRN
Qty: 18 TABLET | Refills: 0 | Status: SHIPPED | OUTPATIENT
Start: 2019-07-19 | End: 2019-08-26

## 2019-07-19 RX ADMIN — ROCURONIUM BROMIDE 10 MG: 10 INJECTION INTRAVENOUS at 16:00

## 2019-07-19 RX ADMIN — PROPOFOL 200 MG: 10 INJECTION, EMULSION INTRAVENOUS at 13:50

## 2019-07-19 RX ADMIN — DEXAMETHASONE SODIUM PHOSPHATE 4 MG: 4 INJECTION, SOLUTION INTRAMUSCULAR; INTRAVENOUS at 14:01

## 2019-07-19 RX ADMIN — Medication 2 G: at 13:55

## 2019-07-19 RX ADMIN — HYDROMORPHONE HYDROCHLORIDE 1 MG: 2 INJECTION, SOLUTION INTRAMUSCULAR; INTRAVENOUS; SUBCUTANEOUS at 13:48

## 2019-07-19 RX ADMIN — ROCURONIUM BROMIDE 10 MG: 10 INJECTION INTRAVENOUS at 14:15

## 2019-07-19 RX ADMIN — MIDAZOLAM HYDROCHLORIDE 2 MG: 2 INJECTION, SOLUTION INTRAMUSCULAR; INTRAVENOUS at 13:45

## 2019-07-19 RX ADMIN — LIDOCAINE HYDROCHLORIDE 100 MG: 20 INJECTION, SOLUTION INFILTRATION; PERINEURAL at 13:50

## 2019-07-19 RX ADMIN — SODIUM CHLORIDE, SODIUM GLUCONATE, SODIUM ACETATE, POTASSIUM CHLORIDE, AND MAGNESIUM CHLORIDE: 526; 502; 368; 37; 30 INJECTION, SOLUTION INTRAVENOUS at 15:06

## 2019-07-19 RX ADMIN — PROPOFOL 20 MG: 10 INJECTION, EMULSION INTRAVENOUS at 14:15

## 2019-07-19 RX ADMIN — PROPOFOL 50 MG: 10 INJECTION, EMULSION INTRAVENOUS at 16:08

## 2019-07-19 RX ADMIN — HYDROMORPHONE HYDROCHLORIDE 0.25 MG: 2 INJECTION, SOLUTION INTRAMUSCULAR; INTRAVENOUS; SUBCUTANEOUS at 15:58

## 2019-07-19 RX ADMIN — HYDROMORPHONE HYDROCHLORIDE 0.5 MG: 1 INJECTION, SOLUTION INTRAMUSCULAR; INTRAVENOUS; SUBCUTANEOUS at 17:50

## 2019-07-19 RX ADMIN — SODIUM CHLORIDE 100 ML/HR: 900 INJECTION, SOLUTION INTRAVENOUS at 12:30

## 2019-07-19 RX ADMIN — SODIUM CHLORIDE: 900 INJECTION, SOLUTION INTRAVENOUS at 16:13

## 2019-07-19 RX ADMIN — ROCURONIUM BROMIDE 40 MG: 10 INJECTION INTRAVENOUS at 13:50

## 2019-07-19 RX ADMIN — ALBUTEROL SULFATE 2.5 MG: 2.5 SOLUTION RESPIRATORY (INHALATION) at 12:55

## 2019-07-19 RX ADMIN — HYDROMORPHONE HYDROCHLORIDE 0.5 MG: 1 INJECTION, SOLUTION INTRAMUSCULAR; INTRAVENOUS; SUBCUTANEOUS at 18:19

## 2019-07-19 RX ADMIN — HYDROMORPHONE HYDROCHLORIDE 0.5 MG: 1 INJECTION, SOLUTION INTRAMUSCULAR; INTRAVENOUS; SUBCUTANEOUS at 18:08

## 2019-07-19 RX ADMIN — HYDROMORPHONE HYDROCHLORIDE 0.25 MG: 2 INJECTION, SOLUTION INTRAMUSCULAR; INTRAVENOUS; SUBCUTANEOUS at 16:09

## 2019-07-19 NOTE — ANESTHESIA POSTPROCEDURE EVALUATION
Patient: Nicol Humphrey    Procedure Summary     Date:  07/19/19 Room / Location:  [facility] OR 03 / [facility] OR    Anesthesia Start:  1346 Anesthesia Stop:  1726    Procedure:  PARATHYROIDECTOMY (N/A Neck) Diagnosis:       Hyperparathyroidism (CMS/[facility])      (Hyperparathyroidism (CMS/[facility]) [E21.3])    Surgeon:  Kelvin Yee MD Provider:  Lee Albarran MD    Anesthesia Type:  general ASA Status:  2          Anesthesia Type: general  Last vitals  BP   109/56 (07/19/19 1219)   Temp   97.4 °F (36.3 °C) (07/19/19 1219)   Pulse   76 (07/19/19 1302)   Resp   18 (07/19/19 1302)     SpO2   98 %(post neb treatment) (07/19/19 1302)     Post Anesthesia Care and Evaluation    Patient location during evaluation: PACU  Level of consciousness: responsive to painful stimuli  Pain score: 0  Pain management: adequate  Airway patency: patent  Anesthetic complications: No anesthetic complications  PONV Status: none  Cardiovascular status: acceptable and hemodynamically stable  Respiratory status: acceptable, spontaneous ventilation and oral airway  Hydration status: acceptable

## 2019-07-19 NOTE — ANESTHESIA PREPROCEDURE EVALUATION
Anesthesia Evaluation     Patient summary reviewed                Airway   Mallampati: II  Neck ROM: full  No difficulty expected  Dental    (+) poor dentition    Pulmonary    (+) recent URI, wheezes,     PE comment: Slight wheeze on right side, albuterol written.  Cardiovascular - normal exam  Exercise tolerance: good (4-7 METS)    NYHA Classification: II    (+) hypertension, hyperlipidemia,       Neuro/Psych  (+) psychiatric history Anxiety and Depression,     GI/Hepatic/Renal/Endo    (+)  GERD,      Musculoskeletal     Abdominal    Substance History      OB/GYN    (-)  Pregnant and history of pregnancy induced hypertension        Other                      Anesthesia Plan    ASA 2     general     intravenous induction   Anesthetic plan, all risks, benefits, and alternatives have been provided, discussed and informed consent has been obtained with: patient.

## 2019-07-19 NOTE — DISCHARGE INSTRUCTIONS
PARATHYROID ADENOMA EXCISION  POST OP INSTRUCTIONS  Dr. Kelvin Yee  [address] [place] [address]  Phone: [phone] (office)  [phone] (hospital)    ACTIVITIES:  1. Keep your head elevated at 30 degrees or more as much as possible for the first 24 hours after surgery.  This will help reduce your postoperative swelling and thus decrease your pain.  2. Expect to rest most of the first two days after surgery, but do get up several times daily to reduce the risk of getting a clot in your legs.  3. No strenuous activity or lifting over 10 lbs. for one week.  Add 5 lbs. a week to that restriction until 6 weeks out from surgery.  Try to avoid squatting and deep bends for about a week.  4. Do not drive while on pain medicine.  You must be off pain meds 24 hours before driving.  5. You can climb stairs, but minimize this and initially do one step at a time (both feet on one step rather than going up with each step.)  SYMPTOMS:  1. Some minor discomfort with swallowing can be expected.  Report any extensive difficulty to the office immediately.  Most symptoms resolve within a few days.   2. You may have numbness around the mouth or cramping after surgery. This may be a sign of a low calcium level or a rapid drop in calcium level. You may take calcitriol if prescribed as directed for your symptoms to help with calcium absorption.  3. Some neck swelling is expected and will resolve over the next few weeks.  Rapid neck swelling should prompt a call to the office or 911.  4. Constipation is common when taking pain medication for surgery.  Over the counter laxatives, such as Miralax, can be used temporarily.   5. Fatigue and decreased stamina is not unusual for about a week or so after surgery due to anesthesia.  Try to take walks and some mild activity between resting.  WOUND SITE:  1. Dressings for this surgery are minimal and consist of a steristrip over the incision with sutures internally.  This will  begin to peel off after the first couple of weeks.  2. Showering may occur while the “film” is in place the day after surgery.   MEALS:  1. A soft diet is recommended the first 1-2 days after surgery and this can be expanded to a regular diet as tolerated.  2. Do NOT take pain pills on an empty stomach.  WORK:  1. In general if you have a sedentary job, you can return to work in 7-10 days.  If lifting or exertion is required it may be 2-3 weeks.    2. Use discretion and remember that your stamina will be decreased post operatively.  3. Return to work notes can be provided at the time of your post-operative appointment.  FOLLOW UP:  1. If no appointment is given, please call and make a post-operative appointment for approximately 7-10 days after the procedure.      MEDICATIONS:  1. You are being discharged with TUMS and pain medicine.

## 2019-07-19 NOTE — ANESTHESIA PROCEDURE NOTES
Airway  Urgency: elective    Airway not difficult    General Information and Staff    Patient location during procedure: OR  CRNA: Payam Covarrubias CRNA    Indications and Patient Condition  Indications for airway management: airway protection    Preoxygenated: yes  Mask difficulty assessment: 1 - vent by mask    Final Airway Details  Final airway type: endotracheal airway      Successful airway: ETT  Cuffed: yes   Successful intubation technique: direct laryngoscopy  Endotracheal tube insertion site: oral  Blade: Charbel  Blade size: 3  ETT size (mm): 7.5  Cormack-Lehane Classification: grade I - full view of glottis  Placement verified by: chest auscultation and capnometry   Cuff volume (mL): 8  Measured from: teeth  ETT to teeth (cm): 21  Number of attempts at approach: 1

## 2019-07-19 NOTE — BRIEF OP NOTE
PARATHYROIDECTOMY  Progress Note    Nicol Humphrey  7/19/2019    Pre-op Diagnosis:   Hyperparathyroidism (CMS/[facility]) [E21.3]       Post-Op Diagnosis Codes:     * Hyperparathyroidism (CMS/[facility]) [E21.3]    Procedure:  RIGHT LOWER POLE PARATHYROIDECTOMY    Surgeon(s):  Kelvin Yee MD    Anesthesia: General    Staff:   Circulator: Maria L Bobo RN; Rody Calderon RN  Scrub Person: Jumana Trujillo  Assistant: Carole Vance CSA    Estimated Blood Loss: minimal    Urine Voided: * No values recorded between 7/19/2019  1:45 PM and 7/19/2019  5:25 PM *    Specimens:                Specimens     ID Source Type Tests Collected By Collected At Frozen?      1 Blood, Venous Line Venous Blood · PTH INTRAOPERATIVE   Kelvin Yee MD 7/19/19 1405      Description: PTH    2 Blood, Venous Line Venous Blood · PTH INTRAOPERATIVE   Kelvin Yee MD 7/19/19 1534      3 Blood, Venous Line Venous Blood · PTH INTRAOPERATIVE   Kelvin Yee MD 7/19/19 1645      Description: PTH    A Parathyroid Gland Tissue · TISSUE PATHOLOGY EXAM   Kelvin Yee MD 7/19/19 1520 Yes     Description: RIGHT SIDED TISSUE - IS THIS PARATHYROID?    Comment: RIGHT SIDED TISSUE - IS THIS PARATHYROID?    B Parathyroid Gland Tissue · TISSUE PATHOLOGY EXAM   Kelvin Yee MD 7/19/19 1621 Yes     Description: RIGHT LOWER POLE TISSUE - **FROZEN**    C Parathyroid Gland Tissue · TISSUE PATHOLOGY EXAM   Kelvin Yee MD 7/19/19 1633 Yes     Description: RIGHT LOWER POLE PARATHYROID ADENOMA - **FROZEN**    Comment: RIGHT LOWER POLE PARATHYROID ADENOMA - **FROZEN**                Drains:  None    Findings: Right lower pole parathyroid adenoma. PTH went from 249.2 to 22.1.    Complications: None      Kelvin Yee MD     Date: 7/19/2019  Time: 5:38 PM

## 2019-07-19 NOTE — ANESTHESIA PROCEDURE NOTES
Peripheral IV    Line placed for Fluids/Medication Admin.  Performed By   CRNA: Payam Covarrubias CRNA  Preanesthetic Checklist  Completed: patient identified, site marked, surgical consent, pre-op evaluation, timeout performed, IV checked, risks and benefits discussed and monitors and equipment checked  Peripheral IV Prep   Prep: ChloraPrep  Peripheral IV Procedure   Laterality:left  Location:  Foot  Catheter size: 22 G         Post Assessment   IV Dressing/Site: clean, dry and intact

## 2019-07-20 NOTE — OP NOTE
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 (CMS/HCC) [E21.3]    Procedure:  RIGHT LOWER POLE PARATHYROIDECTOMY; excision of tiny right upper pole parathyroid tissue for biopsy    Surgeon(s):  Kelvin Yee MD    Anesthesia: General    Staff:   Circulator: Maria L Bobo RN; Rody Calderon RN  Scrub Person: Jumana Trujillo  Assistant: Carole Vance CSA    Estimated Blood Loss: minimal    Specimens:                ID Type Source Tests Collected by Time   1 : PTH Venous Blood Blood, Venous Line PTH INTRAOPERATIVE Kelvin Yee MD 7/19/2019 1405   2 :  Venous Blood Blood, Venous Line PTH INTRAOPERATIVE Kelvin Yee MD 7/19/2019 1534   3 : PTH Venous Blood Blood, Venous Line PTH INTRAOPERATIVE Kelvin Yee MD 7/19/2019 1645   A : RIGHT SIDED TISSUE - IS THIS PARATHYROID? Tissue Parathyroid Gland TISSUE PATHOLOGY EXAM Kelvin Yee MD 7/19/2019 1520   B : RIGHT LOWER POLE TISSUE - **FROZEN** Tissue Parathyroid Gland TISSUE PATHOLOGY EXAM Kelvin Yee MD 7/19/2019 1621   C : RIGHT LOWER POLE PARATHYROID ADENOMA - **FROZEN** Tissue Parathyroid Gland TISSUE PATHOLOGY EXAM Kelvin Yee MD 7/19/2019 1633         Drains: None    Findings: Right lower pole parathyroid adenoma with PTH levels decreasing from 249.2 pg/mL to 12.3 pg/mL    Complications: None    Indications: This patient is a 33-year-old woman with chemical evidence of hyperparathyroidism.  Sestamibi scanning had demonstrated been negative.  She was taken to the operating room today for parathyroidectomy.    Description of procedure: The patient was brought to the operating room and placed supine on the operating table.  After adequate general endotracheal anesthesia, the neck was prepped and draped in a sterile manner.   Briefing and timeout were performed and all parties were in agreement.    A transverse low collar skin incision was made and carried through the platysma.  Subplatysmal flaps were created superiorly and inferiorly.  Strap muscles were  and the anterior surface of the thyroid came into view.  The thyroid was rotated out of its bed by dissecting away the loose areolar tissue between the thyroid and the underside of the straps.  The recurrent nerve was clearly visualized.  The middle thyroid vessels were pushed to one side.    Exploration began on the left side.  I was able to ultimately identify upper and lower pole parathyroid glands by encircling the upper pole of the thyroid with a blue loop and dissecting it off its loose areolar attachments posteriorly.  This allowed clear view of the recurrent laryngeal nerve as well as a superior and inferior parathyroid gland.  These glands appear to be normal.    A right-sided expiration was then performed by rotating the thyroid gland out of its bed by pushing way loose areolar tissue between the thyroid and surrounding posterior structures.  Again the recurrent nerve was identified and avoided.  There appeared to minorly enlarged parathyroid gland superiorly.  I initially thought that there was a parathyroid gland inferiorly but this ultimately proved to be fat.  Portions of the upper pole were taken and the free and sent for frozen section.  These demonstrated parathyroid tissue; however, upon excision there was no decrease in the parathormone level.  In fact, the parathormone level was 249.2 pg/mL.    The incision was extended to both the right and left side.  The right lobe of the thyroid was retracted toward the midline and I again explored the yumiko looking for a right lower pole gland.  None was clearly visualized.  I then turned attention to the carotid sheath and examined the carotid superiorly and inferiorly.  No parathyroid tissue could be detected.  The  retroesophageal space was entered and explored.  Again no parathyroid tissue could be seen.    The upper portion of the thymus was brought into the wound.  At this point a large parathyroid adenoma became apparent and was dissected free of its surrounding structures with sharp dissection and bipolar cautery.  The base was doubly clipped with small hemoclips and the gland was excised.  Frozen section demonstrated that this was a parathyroid adenoma and PTH levels decreased to 22.1 pg/mL, well within normal limits.      Hemostasis was assured patient was given several Valsalva breaths.  No bleeding was noted.  Straps were re-approximated with interrupted 3-0 Vicryl suture; the platysma was re-approximated with interrupted 3-0 Vicryl suture.  In was brought together with continuous 4-0 subcuticular Vicryl suture.    The procedure was terminated.  She tolerated it well sponge and needle counts were correct.  She was returned to recovery in satisfactory condition.                This document has been electronically signed by Kelvin Yee MD on July 19, 2019 10:47 PM      Date: 7/19/2019  Time: 10:47 PM

## 2019-07-22 LAB
LAB AP CASE REPORT: NORMAL
LAB AP CLINICAL INFORMATION: NORMAL
Lab: NORMAL
PATH REPORT.FINAL DX SPEC: NORMAL
PATH REPORT.GROSS SPEC: NORMAL

## 2019-07-29 RX ORDER — BUSPIRONE HYDROCHLORIDE 10 MG/1
TABLET ORAL
Qty: 90 TABLET | Refills: 1 | Status: SHIPPED | OUTPATIENT
Start: 2019-07-29 | End: 2020-01-23 | Stop reason: SDUPTHER

## 2019-07-31 ENCOUNTER — OFFICE VISIT (OUTPATIENT)
Dept: SURGERY | Facility: CLINIC | Age: 34
End: 2019-07-31

## 2019-07-31 DIAGNOSIS — E89.2 S/P PARATHYROIDECTOMY (HCC): Primary | ICD-10-CM

## 2019-07-31 PROCEDURE — 99024 POSTOP FOLLOW-UP VISIT: CPT | Performed by: SURGERY

## 2019-08-09 ENCOUNTER — OFFICE VISIT (OUTPATIENT)
Dept: SURGERY | Facility: CLINIC | Age: 34
End: 2019-08-09

## 2019-08-09 VITALS
WEIGHT: 250.2 LBS | DIASTOLIC BLOOD PRESSURE: 90 MMHG | TEMPERATURE: 98 F | BODY MASS INDEX: 39.27 KG/M2 | HEART RATE: 83 BPM | SYSTOLIC BLOOD PRESSURE: 140 MMHG | HEIGHT: 67 IN

## 2019-08-09 DIAGNOSIS — E89.2 S/P PARATHYROIDECTOMY (HCC): Primary | ICD-10-CM

## 2019-08-09 PROCEDURE — 99024 POSTOP FOLLOW-UP VISIT: CPT | Performed by: SURGERY

## 2019-08-09 NOTE — PROGRESS NOTES
CHIEF COMPLAINT:   Chief Complaint   Patient presents with   • Post-op Follow-up     Post operative parathyroidectomy 7-19-19       HPI: This patient presents for a post-operative visit after undergoing a right parathyroid adenoma excision. Patient reports no problems. No pain. No bruising.    PATHOLOGY:   PTH, Intact & Calcium   Order: [id number]   Status:  Final result   Visible to patient:  Yes ([place])    Ref Range & Units 3wk ago   PTH, Intact 15.0 - 65.0 pg/mL 12.3 Abnormally low     Calcium 8.6 - 10.5 mg/dL 9.8    Resulting Agency  [facility]         Specimen Collected: 07/19/19 17:55 Last Resulted: 07/19/19 18:25           Tissue Pathology Exam: [id number]   Order: [id number]   Collected: 7/19/2019 15:20 Status: Final result Visible to patient: Yes ([place]) Dx: Hyperparathyroidism (CMS/[facility])   Component      Clinical Information    RIGHT SIDED TISSUE - IS THIS PARATHYROID?    Final Diagnosis   1. TISSUE, RIGHT NECK, BIOPSY:   PARATHYROID GLAND.   2. TISSUE, RIGHT NECK, BIOPSY:   ADIPOSE TISSUE.   3. PARATHYROID GLAND, RIGHT LOWER POLE, EXCISION:   PARATHYROID ADENOMA.    Electronically signed by Keyon Cole MD on 7/22/2019 at 1701       Physical Exam  Incisions healing with no infection, cellulitis or hematoma. Suture removed    ASSESSMENT:    Nicol was seen today for post-op follow-up.    Diagnoses and all orders for this visit:    S/P parathyroidectomy (CMS/[facility])        PLAN:    1. The patient will follow-up as needed  2. May shower.   3. May return to normal activity without restrictions.

## 2019-08-09 NOTE — PATIENT INSTRUCTIONS

## 2019-08-26 RX ORDER — ERGOCALCIFEROL 1.25 MG/1
50000 CAPSULE ORAL WEEKLY
Qty: 4 CAPSULE | Refills: 3 | Status: SHIPPED | OUTPATIENT
Start: 2019-08-26 | End: 2019-12-02 | Stop reason: SDUPTHER

## 2019-08-28 ENCOUNTER — ANESTHESIA EVENT (OUTPATIENT)
Dept: GASTROENTEROLOGY | Facility: HOSPITAL | Age: 34
End: 2019-08-28

## 2019-08-28 ENCOUNTER — ANESTHESIA (OUTPATIENT)
Dept: GASTROENTEROLOGY | Facility: HOSPITAL | Age: 34
End: 2019-08-28

## 2019-08-28 ENCOUNTER — HOSPITAL ENCOUNTER (OUTPATIENT)
Facility: HOSPITAL | Age: 34
Setting detail: HOSPITAL OUTPATIENT SURGERY
Discharge: HOME | End: 2019-08-28
Attending: INTERNAL MEDICINE | Admitting: INTERNAL MEDICINE

## 2019-08-28 VITALS
RESPIRATION RATE: 18 BRPM | TEMPERATURE: 97.1 F | OXYGEN SATURATION: 95 % | DIASTOLIC BLOOD PRESSURE: 58 MMHG | HEART RATE: 79 BPM | SYSTOLIC BLOOD PRESSURE: 102 MMHG | WEIGHT: 248.13 LBS | HEIGHT: 67 IN | BODY MASS INDEX: 38.94 KG/M2

## 2019-08-28 DIAGNOSIS — R11.0 NAUSEA: ICD-10-CM

## 2019-08-28 DIAGNOSIS — K21.9 GASTROESOPHAGEAL REFLUX DISEASE, ESOPHAGITIS PRESENCE NOT SPECIFIED: ICD-10-CM

## 2019-08-28 DIAGNOSIS — R10.10 UPPER ABDOMINAL PAIN: ICD-10-CM

## 2019-08-28 PROCEDURE — 88305 TISSUE EXAM BY PATHOLOGIST: CPT | Performed by: INTERNAL MEDICINE

## 2019-08-28 PROCEDURE — 88342 IMHCHEM/IMCYTCHM 1ST ANTB: CPT | Performed by: PATHOLOGY

## 2019-08-28 PROCEDURE — 88305 TISSUE EXAM BY PATHOLOGIST: CPT | Performed by: PATHOLOGY

## 2019-08-28 PROCEDURE — 88342 IMHCHEM/IMCYTCHM 1ST ANTB: CPT | Performed by: INTERNAL MEDICINE

## 2019-08-28 PROCEDURE — 43239 EGD BIOPSY SINGLE/MULTIPLE: CPT | Performed by: INTERNAL MEDICINE

## 2019-08-28 PROCEDURE — 25010000002 PROPOFOL 10 MG/ML EMULSION: Performed by: NURSE ANESTHETIST, CERTIFIED REGISTERED

## 2019-08-28 RX ORDER — PROPOFOL 10 MG/ML
VIAL (ML) INTRAVENOUS AS NEEDED
Status: DISCONTINUED | OUTPATIENT
Start: 2019-08-28 | End: 2019-08-28 | Stop reason: SURG

## 2019-08-28 RX ORDER — DEXTROSE AND SODIUM CHLORIDE 5; .45 G/100ML; G/100ML
30 INJECTION, SOLUTION INTRAVENOUS CONTINUOUS PRN
Status: DISCONTINUED | OUTPATIENT
Start: 2019-08-28 | End: 2019-08-28 | Stop reason: HOSPADM

## 2019-08-28 RX ORDER — LIDOCAINE HYDROCHLORIDE 20 MG/ML
INJECTION, SOLUTION INTRAVENOUS AS NEEDED
Status: DISCONTINUED | OUTPATIENT
Start: 2019-08-28 | End: 2019-08-28 | Stop reason: SURG

## 2019-08-28 RX ADMIN — DEXTROSE AND SODIUM CHLORIDE 30 ML/HR: 5; 450 INJECTION, SOLUTION INTRAVENOUS at 13:34

## 2019-08-28 RX ADMIN — PROPOFOL 150 MG: 10 INJECTION, EMULSION INTRAVENOUS at 14:24

## 2019-08-28 RX ADMIN — LIDOCAINE HYDROCHLORIDE 60 MG: 20 INJECTION, SOLUTION INTRAVENOUS at 14:24

## 2019-08-28 RX ADMIN — PROPOFOL 20 MG: 10 INJECTION, EMULSION INTRAVENOUS at 14:25

## 2019-08-28 NOTE — ANESTHESIA POSTPROCEDURE EVALUATION
Patient: Nicol Humphrey    Procedure Summary     Date:  08/28/19 Room / Location:  [facility] ENDOSCOPY 1 / [facility] ENDOSCOPY    Anesthesia Start:  1418 Anesthesia Stop:  1428    Procedure:  ESOPHAGOGASTRODUODENOSCOPY possible dilation (N/A ) Diagnosis:       Gastroesophageal reflux disease, esophagitis presence not specified      Upper abdominal pain      Nausea      (Gastroesophageal reflux disease, esophagitis presence not specified [K21.9])      (Upper abdominal pain [R10.10])      (Nausea [R11.0])    Surgeon:  Pipo Ayala MD Provider:  Thuy Tamayo CRNA    Anesthesia Type:  MAC ASA Status:  3          Anesthesia Type: MAC  Last vitals  BP   147/64 (08/28/19 1326)   Temp   96.9 °F (36.1 °C) (08/28/19 1326)   Pulse   75 (08/28/19 1326)   Resp   18 (08/28/19 1326)     SpO2   96 % (08/28/19 1326)     Post Anesthesia Care and Evaluation    Patient location during evaluation: bedside  Patient participation: waiting for patient participation  Level of consciousness: sleepy but conscious  Pain score: 0  Pain management: adequate  Airway patency: patent  Anesthetic complications: No anesthetic complications  PONV Status: none  Cardiovascular status: acceptable  Respiratory status: acceptable  Hydration status: acceptable

## 2019-08-30 LAB
LAB AP CASE REPORT: NORMAL
LAB AP DIAGNOSIS COMMENT: NORMAL
PATH REPORT.FINAL DX SPEC: NORMAL
PATH REPORT.GROSS SPEC: NORMAL

## 2019-09-04 ENCOUNTER — OFFICE VISIT (OUTPATIENT)
Dept: GASTROENTEROLOGY | Facility: CLINIC | Age: 34
End: 2019-09-04

## 2019-09-04 VITALS
HEART RATE: 82 BPM | SYSTOLIC BLOOD PRESSURE: 124 MMHG | HEIGHT: 67 IN | BODY MASS INDEX: 38.77 KG/M2 | WEIGHT: 247 LBS | DIASTOLIC BLOOD PRESSURE: 68 MMHG

## 2019-09-04 DIAGNOSIS — K29.50 CHRONIC GASTRITIS WITHOUT BLEEDING, UNSPECIFIED GASTRITIS TYPE: ICD-10-CM

## 2019-09-04 DIAGNOSIS — K21.00 GASTROESOPHAGEAL REFLUX DISEASE WITH ESOPHAGITIS: Primary | ICD-10-CM

## 2019-09-04 PROCEDURE — 99214 OFFICE O/P EST MOD 30 MIN: CPT | Performed by: NURSE PRACTITIONER

## 2019-09-04 RX ORDER — PANTOPRAZOLE SODIUM 40 MG/1
40 TABLET, DELAYED RELEASE ORAL DAILY
Qty: 30 TABLET | Refills: 11 | OUTPATIENT
Start: 2019-09-04 | End: 2020-06-11

## 2019-09-04 RX ORDER — VENLAFAXINE 75 MG/1
75 TABLET ORAL 2 TIMES DAILY
COMMUNITY
End: 2019-10-02 | Stop reason: SDUPTHER

## 2019-09-04 NOTE — PATIENT INSTRUCTIONS
Heartburn    Heartburn is a type of pain or discomfort that can happen in the throat or chest. It is often described as a burning pain. It may also cause a bad taste in the mouth. Heartburn may feel worse when you lie down or bend over. It may be caused by stomach contents that move back up (reflux) into the tube that connects the mouth with the stomach (esophagus).  Follow these instructions at home:  Take these actions to lessen your discomfort and to help avoid problems.  Diet  · Follow a diet as told by your doctor. You may need to avoid foods and drinks such as:  ? Coffee and tea (with or without caffeine).  ? Drinks that contain alcohol.  ? Energy drinks and sports drinks.  ? Carbonated drinks or sodas.  ? Chocolate and cocoa.  ? Peppermint and mint flavorings.  ? Garlic and onions.  ? Horseradish.  ? Spicy and acidic foods, such as peppers, chili powder, swift powder, vinegar, hot sauces, and BBQ sauce.  ? Citrus fruit juices and citrus fruits, such as oranges, jayant, and limes.  ? Tomato-based foods, such as red sauce, chili, salsa, and pizza with red sauce.  ? Fried and fatty foods, such as donuts, french fries, potato chips, and high-fat dressings.  ? High-fat meats, such as hot dogs, rib eye steak, sausage, ham, and stapleton.  ? High-fat dairy items, such as whole milk, butter, and cream cheese.  · Eat small meals often. Avoid eating large meals.  · Avoid drinking large amounts of liquid with your meals.  · Avoid eating meals during the 2-3 hours before bedtime.  · Avoid lying down right after you eat.  · Do not exercise right after you eat.  General instructions  · Pay attention to any changes in your symptoms.  · Take over-the-counter and prescription medicines only as told by your doctor. Do not take aspirin, ibuprofen, or other NSAIDs unless your doctor says it is okay.  · Do not use any tobacco products, including cigarettes, chewing tobacco, and e-cigarettes. If you need help quitting, ask your  doctor.  · Wear loose clothes. Do not wear anything tight around your waist.  · Raise (elevate) the head of your bed about 6 inches (15 cm).  · Try to lower your stress. If you need help doing this, ask your doctor.  · If you are overweight, lose an amount of weight that is healthy for you. Ask your doctor about a safe weight loss goal.  · Keep all follow-up visits as told by your doctor. This is important.  Contact a doctor if:  · You have new symptoms.  · You lose weight and you do not know why it is happening.  · You have trouble swallowing, or it hurts to swallow.  · You have wheezing or a cough that keeps happening.  · Your symptoms do not get better with treatment.  · You have heartburn often for more than two weeks.  Get help right away if:  · You have pain in your arms, neck, jaw, teeth, or back.  · You feel sweaty, dizzy, or light-headed.  · You have chest pain or shortness of breath.  · You throw up (vomit) and your throw up looks like blood or coffee grounds.  · Your poop (stool) is bloody or black.  This information is not intended to replace advice given to you by your health care provider. Make sure you discuss any questions you have with your health care provider.  Document Released: 08/29/2012 Document Revised: 05/25/2017 Document Reviewed: 04/13/2016  [employer] Patient Education © 2019 [employer]

## 2019-09-04 NOTE — PROGRESS NOTES
Chief Complaint   Patient presents with   • Heartburn       Subjective    Nicol Humphrey is a 33 y.o. female. she is here today for follow-up.    Heartburn   She complains of abdominal pain, belching, early satiety, globus sensation and heartburn. She reports no chest pain, no choking, no coughing, no dysphagia, no hoarse voice or no nausea. This is a chronic problem. The current episode started more than 1 year ago. The problem has been waxing and waning. The heartburn duration is less than a minute. The heartburn is located in the substernum. The heartburn does not wake her from sleep. The heartburn limits her activity. The heartburn changes with position. The symptoms are aggravated by certain foods. Risk factors include smoking/tobacco exposure and caffeine use. She has tried a PPI (prevacid several years ) for the symptoms. The treatment provided mild relief.   Patient states symptoms occur frequently and she has been on Prevacid for several years.  Recently had surgery per Dr. Yee and reflux symptoms have worsened since.  EGD noted mildly severe esophagitis, gastritis normal duodenum.  Antral biopsy noted chronic gastritis.  Negative for H. pylori.  Distal esophagus biopsy no chronic inflammation of mucosa negative for dysplasia and goblet cell metaplasia.  Plan; will discontinue Prevacid start patient on Protonix 40 mg/day discussed importance of standard antireflux measures and avoidance of gastric irritants follow-up in 3 months for recheck return office sooner if needed if no improvement with Protonix will try Dexilant         The following portions of the patient's history were reviewed and updated as appropriate:   Past Medical History:   Diagnosis Date   • Anxiety and depression    • Anxiety disorder     Anxiety disorder, unspecified      • Bronchitis    • Cholelithiasis    • Depression    • Disease of thyroid gland    • Encounter for  visit      visit status      • GERD  (gastroesophageal reflux disease)    • Influenza    • Kidney stone    • Parathyroid disorder (CMS/HCC)     elevated ca levels   • Pediculosis due to pediculus humanus capitis    • Rectocele complicating  care, baby not yet delivered     Routine  care      • Strep throat    • Supervision of other normal pregnancy    • Surgical follow-up care    • Transient hypertension of pregnancy     Transient hypertension of pregnancy - not delivered      • Upper respiratory infection    • Urinary tract infection    • Viral gastroenteritis    • Wears glasses      Past Surgical History:   Procedure Laterality Date   • ADENOIDECTOMY     •  SECTION      Repeat low transverse  with left partial salpingectomy. 2014       • CHOLECYSTECTOMY     • ECTOPIC PREGNANCY       Operative laparoscopy, right salpinectomy and removal of ectopic pregnancy. Right ectopic pregnancy. 2008       • ENDOSCOPY N/A 2019    Procedure: ESOPHAGOGASTRODUODENOSCOPY possible dilation;  Surgeon: Pipo Ayala MD;  Location: [facility] ENDOSCOPY;  Service: Gastroenterology   • INJECTION OF MEDICATION      B12 (1)      2010       • PAP SMEAR      Negative for intraepithelial lesion or malignancy. 2009       • PARATHYROIDECTOMY N/A 2019    Procedure: PARATHYROIDECTOMY;  Surgeon: Kelvin Yee MD;  Location: [facility] OR;  Service: General   • TONSILLECTOMY     • TONSILLECTOMY AND ADENOIDECTOMY      Tonsillectomy and adenoidectomy. Chronic tonsillitis. 2000       • TUBAL ABDOMINAL LIGATION       Family History   Problem Relation Age of Onset   • Diabetes Other    • Thyroid disease Other         Grandfather and aunt    • Cervical cancer Mother    • Thyroid disease Mother    • Cancer Mother    • Cholelithiasis Mother    • No Known Problems Father    • Lung disease Daughter    • Kidney disease Daughter    • Heart disease Daughter    • No Known Problems Son    • Throat cancer Maternal Grandmother    •  Cancer Maternal Grandmother    • Lung cancer Maternal Grandfather    • Cancer Maternal Grandfather    • Heart attack Paternal Grandmother    • Stroke Paternal Grandmother    • Stomach cancer Paternal Grandfather    • Cancer Paternal Grandfather    • No Known Problems Daughter    • No Known Problems Daughter      OB History     No data available        Prior to Admission medications    Medication Sig Start Date End Date Taking? Authorizing Provider   busPIRone (BUSPAR) 10 MG tablet TAKE 1 TABLET BY MOUTH 3 TIMES A DAY 19  Yes Isaiah Peter APRN   calcium carbonate (TUMS) 500 MG chewable tablet Chew 500 mg 2 (Two) Times a Day. 19 Yes Kelvin Yee MD   hydrOXYzine (ATARAX) 25 MG tablet Take 1 tablet by mouth At Night As Needed (sleep). 5/10/19  Yes Isaiah Peter APRN   sucralfate (CARAFATE) 1 GM/10ML suspension Take 10 mL by mouth 4 (Four) Times a Day With Meals & at Bedtime. 19  Yes Susan Pineda APRN   venlafaxine (EFFEXOR) 75 MG tablet Take 75 mg by mouth 2 (Two) Times a Day.   Yes Provider, MD Vivek   vitamin D (ERGOCALCIFEROL) 96389 units capsule capsule TAKE 1 CAPSULE BY MOUTH 1 (ONE) TIME PER WEEK. 19  Yes Isaiah Peter APRN     No Known Allergies  Social History     Socioeconomic History   • Marital status:      Spouse name: Not on file   • Number of children: Not on file   • Years of education: Not on file   • Highest education level: Not on file   Tobacco Use   • Smoking status: Current Every Day Smoker     Packs/day: 1.00     Types: Cigarettes     Last attempt to quit: 1/3/2018     Years since quittin.6   • Smokeless tobacco: Never Used   Substance and Sexual Activity   • Alcohol use: No   • Drug use: No   • Sexual activity: Yes     Partners: Male     Birth control/protection: Surgical       Review of Systems  Review of Systems   HENT: Negative for hoarse voice.    Respiratory: Negative for cough and choking.    Cardiovascular: Negative for chest  "pain.   Gastrointestinal: Positive for abdominal pain and heartburn. Negative for dysphagia and nausea.        /68 (BP Location: Left arm)   Pulse 82   Ht 170.2 cm (67\")   Wt 112 kg (247 lb)   BMI 38.69 kg/m²     Objective    Physical Exam   Constitutional: She is oriented to person, place, and time. She appears well-developed and well-nourished. She is cooperative. No distress.   HENT:   Head: Normocephalic and atraumatic.   Neck: Normal range of motion. Neck supple. No thyromegaly present.   Cardiovascular: Normal rate, regular rhythm and normal heart sounds.   Pulmonary/Chest: Effort normal and breath sounds normal. She has no wheezes. She has no rhonchi. She has no rales.   Abdominal: Soft. Normal appearance and bowel sounds are normal. She exhibits no distension. There is no hepatosplenomegaly. There is tenderness in the epigastric area. There is no rigidity and no guarding. No hernia.   Lymphadenopathy:     She has no cervical adenopathy.   Neurological: She is alert and oriented to person, place, and time.   Skin: Skin is warm, dry and intact. No rash noted. No pallor.   Psychiatric: She has a normal mood and affect. Her speech is normal.     Admission on 08/28/2019, Discharged on 08/28/2019   Component Date Value Ref Range Status   • Case Report 08/28/2019    Final                    Value:Surgical Pathology Report                         Case: [id number]                                  Authorizing Provider:  Pipo Ayala MD        Collected:           08/28/2019 02:28 PM          Ordering Location:     [facility]             Received:            08/29/2019 07:11 AM                                 [place] [facility]                                                     Pathologist:           Jose Robbins MD                                                         Specimens:   1) - Gastric, Antrum, antrum bx                                                                     2) - " Esophagus, Distal, distal esophagus bx                                               • Final Diagnosis 08/28/2019    Final                    Value:This result contains rich text formatting which cannot be displayed here.   • Comment 08/28/2019    Final                    Value:This result contains rich text formatting which cannot be displayed here.   • Gross Description 08/28/2019    Final                    Value:This result contains rich text formatting which cannot be displayed here.     Assessment/Plan      1. Gastroesophageal reflux disease with esophagitis    2. Chronic gastritis without bleeding, unspecified gastritis type    .       Orders placed during this encounter include:  No orders of the defined types were placed in this encounter.      * Surgery not found *    Review and/or summary of lab tests, radiology, procedures, medications. Review and summary of old records and obtaining of history. The risks and benefits of my recommendations, as well as other treatment options were discussed with the patient today. Questions were answered.    New Medications Ordered This Visit   Medications   • pantoprazole (PROTONIX) 40 MG EC tablet     Sig: Take 1 tablet by mouth Daily.     Dispense:  30 tablet     Refill:  11       Follow-up: Return in about 3 months (around 12/4/2019).          This document has been electronically signed by ARIANNA Alicea on September 4, 2019 8:56 AM             Results for orders placed or performed during the hospital encounter of 08/28/19   Tissue Pathology Exam   Result Value Ref Range    Case Report       Surgical Pathology Report                         Case: [id number]                                  Authorizing Provider:  Pipo Ayala MD        Collected:           08/28/2019 02:28 PM          Ordering Location:     [facility]             Received:            08/29/2019 07:11 AM                                 [place] [facility]                                "                      Pathologist:           Jose Robbins MD                                                         Specimens:   1) - Gastric, Antrum, antrum bx                                                                     2) - Esophagus, Distal, distal esophagus bx                                                Final Diagnosis       1.  MUCOSA, ANTRUM OF STOMACH:   CHRONIC GASTRITIS.   NEGATIVE FOR HELICOBACTER PYLORI (HP IMMUNOSTAIN).     2.  MUCOSA, DISTAL ESOPHAGUS:   CHRONIC INFLAMMATION OF CARDIAC GASTRIC MUCOSA AND REACTIVE CHANGE OF SQUAMOUS MUCOSA.   NEGATIVE FOR DYSPLASIA AND GOBLET CELL METAPLASIA.       Comment       Helicobacter pylori (HP) immunostain is performed (block 1) because an appropriate inflammatory milieu is present and organisms are not seen on H & E stained slides.     HP immunostain was developed and [facility] performance characteristics [facility]  It has not been cleared or approved by the U.S.Food and Drug Administration.  The FDA has determined that such clearance of approval is not necessary.  This test is used for clinical purposes.  It should not be regarded as investigational or for research.  This laboratory is certified under the Clinical Laboratory Amendments of 1988 (CLIA-88) as qualified to perform high complexity clinical laboratory testing.     All controls show appropriate reactivity.        Gross Description       1.  The first container is labeled \"antrum of stomach\" and has nodular bits of white soft tissue measuring 0.4 cc in aggregate.  The entire specimen is embedded as 1A.    2.  The second container is labeled \"distal esophagus\" and has nodular bits of white soft tissue measuring 0.4 cc in aggregate.  The entire specimen is embedded as 2A.     Results for orders placed or performed during the hospital encounter of 07/19/19   [facility]   Result Value Ref Range    Extra Tube Hold for add-ons.    PTH " Intraoperative   Result Value Ref Range    PTH, Intraoperative 127.2 (H) 15.0 - 65.0 pg/mL   PTH, Intact & Calcium   Result Value Ref Range    PTH, Intact 12.3 (L) 15.0 - 65.0 pg/mL    Calcium 9.8 8.6 - 10.5 mg/dL   PTH, Intact & Calcium   Result Value Ref Range    PTH, Intact 22.1 15.0 - 65.0 pg/mL    Calcium 9.6 8.6 - 10.5 mg/dL   PTH, Intact & Calcium   Result Value Ref Range    PTH, Intact 249.2 (H) 15.0 - 65.0 pg/mL    Calcium 9.3 8.6 - 10.5 mg/dL   Tissue Pathology Exam   Result Value Ref Range    Case Report       Surgical Pathology Report                         Case: [id number]                                  Authorizing Provider:  Kelvin Yee MD           Collected:           07/19/2019 03:20 PM          Ordering Location:     [facility]             Received:            07/19/2019 03:23 PM                                 [place] [facility]                                                              Pathologist:           Keyon Cole MD                                                           Specimens:   1) - Parathyroid Gland, RIGHT SIDED TISSUE - IS THIS PARATHYROID?                                   2) - Parathyroid Gland, RIGHT LOWER POLE TISSUE - **FROZEN**                                        3) - Parathyroid Gland, RIGHT LOWER POLE PARATHYROID ADENOMA - **FROZEN**                  Clinical Information       RIGHT SIDED TISSUE - IS THIS PARATHYROID?      Final Diagnosis       1.  TISSUE, RIGHT NECK, BIOPSY:  PARATHYROID GLAND.    2.  TISSUE, RIGHT NECK, BIOPSY:  ADIPOSE TISSUE.    3.  PARATHYROID GLAND, RIGHT LOWER POLE, EXCISION:  PARATHYROID ADENOMA.      Intraoperative Consultation       The frozen section diagnosis for specimen 1 was called to Dr. Yee:  Parathyroid gland    The frozen section diagnosis for specimen 2 was called to Dr. Yee:  Adipose tissue.    The frozen section diagnosis for specimen 3 was called to Dr. Yee:  Parathyroid adenoma.      Gross Description       1.  The  first specimen consists of a red nodule measuring 0.6 x 0.5 x 0.4 cm.  Adipose tissue is attached.  The specimen is entirely submitted for frozen section.    2.  The second specimen consists of a yellow fragment measuring 0.3 x 0.3 x 0.2 cm.  Totally submitted for frozen section.    3.  The third specimen consists of a reddish-tan gland measuring 1.6 x 1.3 x 0.5 cm and weighing 0.6 grams.  This is entirely submitted for frozen section.     Phosphorus   Result Value Ref Range    Phosphorus 2.9 2.5 - 4.5 mg/dL   Calcium   Result Value Ref Range    Calcium 9.8 8.6 - 10.5 mg/dL   Results for orders placed or performed in visit on 06/18/19   Calcium, Urine, 24 Hour - Urine, Clean Catch   Result Value Ref Range    Calcium, 24H Urine 243.0 100.0 - 300.0 mg/24 hr    Calcium, Urine 8.1 mg/dL    24H Urine Volume 3,000 mL    Time (Hours) 24 hrs   Creatinine, Urine, 24 Hour - Urine, Clean Catch   Result Value Ref Range    Creatinine, 24H 1.50 0.70 - 1.60 g/24 hr    Creatinine, Urine 49.9 mg/dL    24H Urine Volume 3,000 mL    Time (Hours) 24 hrs   Vitamin D 25 Hydroxy   Result Value Ref Range    25 Hydroxy, Vitamin D 15.4 (L) 30.0 - 100.0 ng/ml   Prolactin   Result Value Ref Range    Prolactin 4.46 (L) 4.79 - 23.30 ng/mL   Insulin-like Growth Factor   Result Value Ref Range    Insulin-Like Growth Factor-1 167 73 - 243 ng/mL   TSH   Result Value Ref Range    TSH 1.390 0.270 - 4.200 mIU/mL   Renal Function Panel   Result Value Ref Range    Glucose 92 65 - 99 mg/dL    BUN 10 6 - 20 mg/dL    Creatinine 0.67 0.57 - 1.00 mg/dL    Sodium 140 136 - 145 mmol/L    Potassium 5.1 3.5 - 5.2 mmol/L    Chloride 106 98 - 107 mmol/L    CO2 24.2 22.0 - 29.0 mmol/L    Calcium 10.6 (H) 8.6 - 10.5 mg/dL    Albumin 4.20 3.50 - 5.20 g/dL    Phosphorus 2.6 2.5 - 4.5 mg/dL    Anion Gap 9.8 mmol/L    BUN/Creatinine Ratio 14.9 7.0 - 25.0    eGFR Non African Amer 102 >60 mL/min/1.73   Results for orders placed or performed in visit on 05/10/19   PTH,  Intact & Calcium   Result Value Ref Range    PTH, Intact 151.0 (H) 15.0 - 65.0 pg/mL    Calcium 10.6 (H) 8.6 - 10.5 mg/dL   Vitamin D 25 Hydroxy   Result Value Ref Range    25 Hydroxy, Vitamin D 17.2 (L) 30.0 - 100.0 ng/ml   Results for orders placed or performed during the hospital encounter of 04/17/19   POCT Rapid Strep A   Result Value Ref Range    Rapid Strep A Screen Negative Negative, VALID, INVALID, Not Performed    Internal Control Passed Passed    Lot Number [id number]     Expiration Date 4/30/20    Results for orders placed or performed in visit on 01/14/19   CBC Auto Differential   Result Value Ref Range    WBC 7.16 3.20 - 9.80 10*3/mm3    RBC 4.05 3.77 - 5.16 10*6/mm3    Hemoglobin 13.1 12.0 - 15.5 g/dL    Hematocrit 38.6 35.0 - 45.0 %    MCV 95.3 80.0 - 98.0 fL    MCH 32.3 26.5 - 34.0 pg    MCHC 33.9 31.4 - 36.0 g/dL    RDW 12.6 11.5 - 14.5 %    RDW-SD 44.1 36.4 - 46.3 fl    MPV 9.6 8.0 - 12.0 fL    Platelets 353 150 - 450 10*3/mm3    Neutrophil % 58.4 37.0 - 80.0 %    Lymphocyte % 32.4 10.0 - 50.0 %    Monocyte % 7.3 0.0 - 12.0 %    Eosinophil % 1.3 0.0 - 7.0 %    Basophil % 0.3 0.0 - 2.0 %    Immature Grans % 0.3 0.0 - 0.5 %    Neutrophils, Absolute 4.19 2.00 - 8.60 10*3/mm3    Lymphocytes, Absolute 2.32 0.60 - 4.20 10*3/mm3    Monocytes, Absolute 0.52 0.00 - 0.90 10*3/mm3    Eosinophils, Absolute 0.09 0.00 - 0.70 10*3/mm3    Basophils, Absolute 0.02 0.00 - 0.20 10*3/mm3    Immature Grans, Absolute 0.02 0.00 - 0.02 10*3/mm3   TSH   Result Value Ref Range    TSH 1.880 0.460 - 4.680 mIU/mL   Hemoglobin A1c   Result Value Ref Range    Hemoglobin A1C 5.1 4 - 5.6 %   Comprehensive Metabolic Panel   Result Value Ref Range    Glucose 91 60 - 100 mg/dL    BUN 8 7 - 21 mg/dL    Creatinine 0.67 0.50 - 1.00 mg/dL    Sodium 136 (L) 137 - 145 mmol/L    Potassium 4.2 3.5 - 5.1 mmol/L    Chloride 105 95 - 110 mmol/L    CO2 27.0 22.0 - 31.0 mmol/L    Calcium 10.7 (H) 8.4 - 10.2 mg/dL    Total Protein 6.8 6.3 - 8.6  g/dL    Albumin 4.40 3.40 - 4.80 g/dL    ALT (SGPT) 16 9 - 52 U/L    AST (SGOT) 26 14 - 36 U/L    Alkaline Phosphatase 71 38 - 126 U/L    Total Bilirubin 0.2 0.2 - 1.3 mg/dL    eGFR Non  Amer 102 64 - 149 mL/min/1.73    Globulin 2.4 2.3 - 3.5 gm/dL    A/G Ratio 1.8 1.1 - 1.8 g/dL    BUN/Creatinine Ratio 11.9 7.0 - 25.0    Anion Gap 4.0 (L) 5.0 - 15.0 mmol/L   Results for orders placed or performed in visit on 04/09/18   HPV DNA Probe, Direct - ThinPrep Vial, Vagina   Result Value Ref Range    HPV Aptima Negative Negative   Liquid-based Pap Smear, Screening   Result Value Ref Range    Case Report       Gynecologic Cytology Report                       Case: [id number]                                  Authorizing Provider:  Maria L Jain MD         Collected:           04/09/2018 10:14 AM          Ordering Location[facility]     [facility]     Received:            04/09/2018 10:53 AM                                 [facility]                                                                 First Screen:          Linette Jaime                                                           Specimen:    Liquid-Based Pap, Screening, Cervix                                                        Interpretation Negative for intraepithelial lesion or malignancy      Specimen Adequacy       Satisfactory for evaluation, endocervical/transformation zone component present    Additional Information       Disclaimer: Cervical cytology is a screening test primarily for squamous cancer and its precursors and has associated false-negative and false-positive results.  Technologies such as liquid-based preparations may decrease but will not eliminate all false-negative results.  Follow-up of unexplained clinical signs and symptoms is recommended to minimize false-negative results. (The [employer] System for Reporting Cervical Cytology: [employer], 2015).      Embedded Images       *Note: Due to a large number of results and/or  encounters for the requested time period, some results have not been displayed. A complete set of results can be found in Results Review.

## 2019-09-25 DIAGNOSIS — J40 BRONCHITIS: ICD-10-CM

## 2019-09-25 DIAGNOSIS — R06.2 WHEEZING: ICD-10-CM

## 2019-09-25 RX ORDER — ALBUTEROL SULFATE 90 UG/1
AEROSOL, METERED RESPIRATORY (INHALATION)
Qty: 8.5 INHALER | Refills: 0 | OUTPATIENT
Start: 2019-09-25

## 2019-09-25 NOTE — TELEPHONE ENCOUNTER
This is a temp medication given in urgent care and she will need to follow up with her PCP for recheck and further tx.

## 2019-10-02 RX ORDER — VENLAFAXINE HYDROCHLORIDE 75 MG/1
CAPSULE, EXTENDED RELEASE ORAL
Qty: 30 CAPSULE | Refills: 2 | Status: SHIPPED | OUTPATIENT
Start: 2019-10-02 | End: 2019-12-30

## 2019-10-14 ENCOUNTER — LAB (OUTPATIENT)
Dept: LAB | Facility: HOSPITAL | Age: 34
End: 2019-10-14

## 2019-10-14 ENCOUNTER — OFFICE VISIT (OUTPATIENT)
Dept: FAMILY MEDICINE CLINIC | Facility: CLINIC | Age: 34
End: 2019-10-14

## 2019-10-14 VITALS
DIASTOLIC BLOOD PRESSURE: 82 MMHG | BODY MASS INDEX: 38.94 KG/M2 | RESPIRATION RATE: 20 BRPM | HEART RATE: 81 BPM | WEIGHT: 248.1 LBS | OXYGEN SATURATION: 98 % | HEIGHT: 67 IN | SYSTOLIC BLOOD PRESSURE: 130 MMHG

## 2019-10-14 DIAGNOSIS — F33.41 RECURRENT MAJOR DEPRESSIVE DISORDER, IN PARTIAL REMISSION (HCC): ICD-10-CM

## 2019-10-14 DIAGNOSIS — Z23 NEED FOR IMMUNIZATION WITH DIPHTHERIA, TETANUS, AND POLIOVIRUS VACCINE: ICD-10-CM

## 2019-10-14 DIAGNOSIS — R23.2 ABNORMAL FLUSHING AND SWEATING: ICD-10-CM

## 2019-10-14 DIAGNOSIS — Z23 NEED FOR INFLUENZA VACCINATION: Primary | ICD-10-CM

## 2019-10-14 DIAGNOSIS — E89.2 S/P PARATHYROIDECTOMY (HCC): ICD-10-CM

## 2019-10-14 DIAGNOSIS — F41.1 GENERALIZED ANXIETY DISORDER: ICD-10-CM

## 2019-10-14 DIAGNOSIS — R61 ABNORMAL FLUSHING AND SWEATING: ICD-10-CM

## 2019-10-14 LAB
25(OH)D3 SERPL-MCNC: 28.5 NG/ML (ref 30–100)
ALBUMIN SERPL-MCNC: 4.6 G/DL (ref 3.5–5.2)
ALBUMIN/GLOB SERPL: 1.9 G/DL
ALP SERPL-CCNC: 72 U/L (ref 39–117)
ALT SERPL W P-5'-P-CCNC: 23 U/L (ref 1–33)
ANION GAP SERPL CALCULATED.3IONS-SCNC: 8.8 MMOL/L (ref 5–15)
AST SERPL-CCNC: 13 U/L (ref 1–32)
BASOPHILS # BLD AUTO: 0.04 10*3/MM3 (ref 0–0.2)
BASOPHILS NFR BLD AUTO: 0.6 % (ref 0–1.5)
BILIRUB SERPL-MCNC: 0.3 MG/DL (ref 0.2–1.2)
BUN BLD-MCNC: 8 MG/DL (ref 6–20)
BUN/CREAT SERPL: 12.9 (ref 7–25)
CALCIUM SPEC-SCNC: 8.9 MG/DL (ref 8.6–10.5)
CHLORIDE SERPL-SCNC: 102 MMOL/L (ref 98–107)
CO2 SERPL-SCNC: 27.2 MMOL/L (ref 22–29)
CREAT BLD-MCNC: 0.62 MG/DL (ref 0.57–1)
DEPRECATED RDW RBC AUTO: 41.4 FL (ref 37–54)
EOSINOPHIL # BLD AUTO: 0.08 10*3/MM3 (ref 0–0.4)
EOSINOPHIL NFR BLD AUTO: 1.2 % (ref 0.3–6.2)
ERYTHROCYTE [DISTWIDTH] IN BLOOD BY AUTOMATED COUNT: 11.9 % (ref 12.3–15.4)
FSH SERPL-ACNC: 3.98 MIU/ML
GFR SERPL CREATININE-BSD FRML MDRD: 111 ML/MIN/1.73
GLOBULIN UR ELPH-MCNC: 2.4 GM/DL
GLUCOSE BLD-MCNC: 97 MG/DL (ref 65–99)
HBA1C MFR BLD: 5.54 % (ref 4.8–5.6)
HCT VFR BLD AUTO: 38.6 % (ref 34–46.6)
HGB BLD-MCNC: 13.4 G/DL (ref 12–15.9)
IMM GRANULOCYTES # BLD AUTO: 0.03 10*3/MM3 (ref 0–0.05)
IMM GRANULOCYTES NFR BLD AUTO: 0.5 % (ref 0–0.5)
LH SERPL-ACNC: 4.14 MIU/ML
LYMPHOCYTES # BLD AUTO: 1.96 10*3/MM3 (ref 0.7–3.1)
LYMPHOCYTES NFR BLD AUTO: 29.7 % (ref 19.6–45.3)
MCH RBC QN AUTO: 33 PG (ref 26.6–33)
MCHC RBC AUTO-ENTMCNC: 34.7 G/DL (ref 31.5–35.7)
MCV RBC AUTO: 95.1 FL (ref 79–97)
MONOCYTES # BLD AUTO: 0.49 10*3/MM3 (ref 0.1–0.9)
MONOCYTES NFR BLD AUTO: 7.4 % (ref 5–12)
NEUTROPHILS # BLD AUTO: 3.99 10*3/MM3 (ref 1.7–7)
NEUTROPHILS NFR BLD AUTO: 60.6 % (ref 42.7–76)
NRBC BLD AUTO-RTO: 0 /100 WBC (ref 0–0.2)
PLATELET # BLD AUTO: 338 10*3/MM3 (ref 140–450)
PMV BLD AUTO: 10.2 FL (ref 6–12)
POTASSIUM BLD-SCNC: 4.2 MMOL/L (ref 3.5–5.2)
PROT SERPL-MCNC: 7 G/DL (ref 6–8.5)
RBC # BLD AUTO: 4.06 10*6/MM3 (ref 3.77–5.28)
SODIUM BLD-SCNC: 138 MMOL/L (ref 136–145)
T4 FREE SERPL-MCNC: 0.85 NG/DL (ref 0.93–1.7)
TSH SERPL DL<=0.05 MIU/L-ACNC: 1.85 UIU/ML (ref 0.27–4.2)
WBC NRBC COR # BLD: 6.59 10*3/MM3 (ref 3.4–10.8)

## 2019-10-14 PROCEDURE — 82306 VITAMIN D 25 HYDROXY: CPT

## 2019-10-14 PROCEDURE — 83036 HEMOGLOBIN GLYCOSYLATED A1C: CPT

## 2019-10-14 PROCEDURE — 80050 GENERAL HEALTH PANEL: CPT

## 2019-10-14 PROCEDURE — 90715 TDAP VACCINE 7 YRS/> IM: CPT | Performed by: NURSE PRACTITIONER

## 2019-10-14 PROCEDURE — 83001 ASSAY OF GONADOTROPIN (FSH): CPT

## 2019-10-14 PROCEDURE — 36415 COLL VENOUS BLD VENIPUNCTURE: CPT

## 2019-10-14 PROCEDURE — 83002 ASSAY OF GONADOTROPIN (LH): CPT

## 2019-10-14 PROCEDURE — 99214 OFFICE O/P EST MOD 30 MIN: CPT | Performed by: NURSE PRACTITIONER

## 2019-10-14 PROCEDURE — 90471 IMMUNIZATION ADMIN: CPT | Performed by: NURSE PRACTITIONER

## 2019-10-14 PROCEDURE — 84439 ASSAY OF FREE THYROXINE: CPT

## 2019-10-14 NOTE — PROGRESS NOTES
Subjective   Nicol Humphrey is a 34 y.o. female.     Ms. Humphrey is a 34-year-old female who presents today for follow-up related to depression, anxiety, postop parathyroidectomy and for evaluation of flushing and sweating.  Patient denies chest pain, shortness of breath or palpitations.  She reports her depression and anxiety are stable with no suicidal homicidal ideations.  She is taking her medication as prescribed.  Patient underwent parathyroidectomy for hyperparathyroidism.  Patient denies any complications from surgery and has been cleared by surgeon.  Patient also reports abnormal flushing and sweating since the surgery.  She denies any recent illness.         The following portions of the patient's history were reviewed and updated as appropriate: allergies, current medications, past family history, past medical history, past social history, past surgical history and problem list.    Review of Systems   Constitutional: Positive for diaphoresis (reports episodes of flushing and sweating). Negative for activity change, appetite change, fatigue, unexpected weight gain and unexpected weight loss.   HENT: Negative for congestion, sore throat, trouble swallowing and voice change.    Eyes: Negative.    Respiratory: Negative for cough, chest tightness, shortness of breath and wheezing.    Cardiovascular: Negative for chest pain, palpitations and leg swelling.   Gastrointestinal: Negative for abdominal pain, constipation, diarrhea, nausea and vomiting.   Endocrine: Negative.  Negative for cold intolerance, heat intolerance, polydipsia, polyphagia and polyuria.   Genitourinary: Negative for dysuria.   Musculoskeletal: Negative for arthralgias and myalgias.   Skin: Negative for rash.   Allergic/Immunologic: Negative.    Neurological: Negative.    Hematological: Negative.    Psychiatric/Behavioral: Negative.        Objective   Physical Exam   Constitutional: She is oriented to person, place, and time. She appears  well-developed and well-nourished. No distress.   HENT:   Head: Normocephalic and atraumatic.   Eyes: Conjunctivae and EOM are normal. Pupils are equal, round, and reactive to light.   Neck: Normal range of motion. No thyromegaly present.   Cardiovascular: Normal rate, regular rhythm and normal heart sounds.   Pulmonary/Chest: Effort normal and breath sounds normal. No stridor. No respiratory distress. She has no wheezes. She has no rales.   Musculoskeletal: Normal range of motion. She exhibits no edema or tenderness.   Lymphadenopathy:     She has no cervical adenopathy.   Neurological: She is alert and oriented to person, place, and time.   Skin: Skin is warm and dry. No rash noted. She is not diaphoretic. No erythema. No pallor.   Psychiatric: She has a normal mood and affect. Her behavior is normal. Judgment and thought content normal.   Nursing note and vitals reviewed.        Assessment/Plan   Nicol was seen today for follow-up.    Diagnoses and all orders for this visit:    Need for influenza vaccination  -     Influenza Vac Subunit Quad (FLUCELVAX) injection 0.5 mL    Need for immunization with diphtheria, tetanus, and poliovirus vaccine  -     Tdap Vaccine Greater Than or Equal To 8yo IM    Generalized anxiety disorder    S/P parathyroidectomy  -     Vitamin D 25 Hydroxy; Future  -     Comprehensive Metabolic Panel; Future  -     CBC & Differential; Future  -     TSH; Future  -     T4, Free; Future    Recurrent major depressive disorder, in partial remission (CMS/HCC)    Abnormal flushing and sweating  -     TSH; Future  -     T4, Free; Future  -     Hemoglobin A1c; Future  -     FSH & LH; Future    1.  Generalized anxiety disorder-controlled.  No change in therapy at this time.  Patient denies suicidal homicidal ideations.    2.  Recurrent major depressive disorder in remission- controlled.  No change in therapy at this time.  Patient denies suicidal homicidal ideations.    3.  Postop  parathyroidectomy-vitamin D, CMP, CBC, TSH, T4.  Will call with results.    4.  Abnormal flushing and sweating-TSH, T4, hemoglobin A 1C, FSH and LH.  Will call with results.    5.  Health maintenance- Tdap vaccine and seasonal flu vaccine today.    6.  Follow-up in 6 months or sooner if needed.            This document has been electronically signed by ARIANNA Lo on October 14, 2019 4:57 PM

## 2019-10-15 NOTE — PROGRESS NOTES
Vitamin D improving.  Continue vitamin D weekly.  Other labs stable.  Follow-up as scheduled or if symptoms continue.

## 2019-10-19 DIAGNOSIS — J40 BRONCHITIS: ICD-10-CM

## 2019-10-19 DIAGNOSIS — R06.2 WHEEZING: ICD-10-CM

## 2019-10-21 RX ORDER — ALBUTEROL SULFATE 90 UG/1
AEROSOL, METERED RESPIRATORY (INHALATION)
Qty: 8.5 INHALER | Refills: 0 | OUTPATIENT
Start: 2019-10-21 | End: 2020-06-11

## 2019-10-29 RX ORDER — SUCRALFATE 1 G/10ML
SUSPENSION ORAL
Qty: 1200 ML | Refills: 4 | OUTPATIENT
Start: 2019-10-29 | End: 2020-06-11

## 2019-12-02 RX ORDER — ERGOCALCIFEROL 1.25 MG/1
50000 CAPSULE ORAL WEEKLY
Qty: 4 CAPSULE | Refills: 3 | Status: SHIPPED | OUTPATIENT
Start: 2019-12-02 | End: 2020-04-14 | Stop reason: SDUPTHER

## 2019-12-30 RX ORDER — VENLAFAXINE HYDROCHLORIDE 75 MG/1
CAPSULE, EXTENDED RELEASE ORAL
Qty: 30 CAPSULE | Refills: 2 | Status: SHIPPED | OUTPATIENT
Start: 2019-12-30 | End: 2020-03-30

## 2020-01-03 ENCOUNTER — TELEPHONE (OUTPATIENT)
Dept: FAMILY MEDICINE CLINIC | Facility: CLINIC | Age: 35
End: 2020-01-03

## 2020-01-03 ENCOUNTER — OFFICE VISIT (OUTPATIENT)
Dept: FAMILY MEDICINE CLINIC | Facility: CLINIC | Age: 35
End: 2020-01-03

## 2020-01-03 VITALS
HEART RATE: 85 BPM | BODY MASS INDEX: 38.6 KG/M2 | DIASTOLIC BLOOD PRESSURE: 90 MMHG | WEIGHT: 245.9 LBS | SYSTOLIC BLOOD PRESSURE: 110 MMHG | OXYGEN SATURATION: 98 % | RESPIRATION RATE: 18 BRPM | HEIGHT: 67 IN

## 2020-01-03 DIAGNOSIS — J40 BRONCHITIS: ICD-10-CM

## 2020-01-03 DIAGNOSIS — J01.00 ACUTE NON-RECURRENT MAXILLARY SINUSITIS: Primary | ICD-10-CM

## 2020-01-03 DIAGNOSIS — J20.9 ACUTE BRONCHITIS, UNSPECIFIED ORGANISM: ICD-10-CM

## 2020-01-03 PROCEDURE — 99213 OFFICE O/P EST LOW 20 MIN: CPT | Performed by: NURSE PRACTITIONER

## 2020-01-03 RX ORDER — BROMPHENIRAMINE MALEATE, PSEUDOEPHEDRINE HYDROCHLORIDE, AND DEXTROMETHORPHAN HYDROBROMIDE 2; 30; 10 MG/5ML; MG/5ML; MG/5ML
5 SYRUP ORAL 4 TIMES DAILY PRN
Qty: 150 ML | Refills: 0 | Status: SHIPPED | OUTPATIENT
Start: 2020-01-03 | End: 2020-01-13

## 2020-01-03 RX ORDER — IPRATROPIUM BROMIDE AND ALBUTEROL SULFATE 2.5; .5 MG/3ML; MG/3ML
3 SOLUTION RESPIRATORY (INHALATION) EVERY 4 HOURS PRN
Qty: 360 ML | Refills: 0 | Status: SHIPPED | OUTPATIENT
Start: 2020-01-03 | End: 2020-01-27

## 2020-01-03 RX ORDER — CEFUROXIME AXETIL 500 MG/1
500 TABLET ORAL 2 TIMES DAILY
Qty: 20 TABLET | Refills: 0 | Status: SHIPPED | OUTPATIENT
Start: 2020-01-03 | End: 2020-01-13

## 2020-01-03 RX ORDER — DEXTROMETHORPHAN HYDROBROMIDE AND PROMETHAZINE HYDROCHLORIDE 15; 6.25 MG/5ML; MG/5ML
5 SYRUP ORAL 4 TIMES DAILY PRN
Qty: 100 ML | Refills: 0 | Status: SHIPPED | OUTPATIENT
Start: 2020-01-03 | End: 2020-01-03 | Stop reason: RX

## 2020-01-03 RX ORDER — PREDNISONE 20 MG/1
20 TABLET ORAL 2 TIMES DAILY
Qty: 10 TABLET | Refills: 0 | Status: SHIPPED | OUTPATIENT
Start: 2020-01-03 | End: 2020-01-08

## 2020-01-03 NOTE — PROGRESS NOTES
Subjective   Nicol Humphrey is a 34 y.o. female.     Ms. Humphrey is a 34-year-old female who presents today for evaluation of cough, sinus drainage and pressure.  Symptoms have been ongoing since 12/23/2019.  At that time she presented to urgent care and was given 2 kinds of cough medicine and Sudafed.  She tested negative for the flu.  4 days later on 12/27/2019 she presented again because her symptoms were not improving.  Again she tested negative for the flu and was given a different type of cough medicine, a Z-Tao and a Kenalog injection.  It is now 1 week later and her symptoms have only marginally improved.  She states she no longer has fever, chills or body aches but still has sinus pressure, sinus discharge, persistent cough that is keeping her awake and shortness of breath with chest tightness.  She is taken all previously prescribed medication.  She was not able to get Promethazine DM cough syrup because it was out of stock at the time when it was prescribed.       The following portions of the patient's history were reviewed and updated as appropriate: allergies, current medications, past family history, past medical history, past social history, past surgical history and problem list.    Review of Systems   Constitutional: Positive for fatigue. Negative for activity change, appetite change, unexpected weight gain and unexpected weight loss.   HENT: Positive for congestion, postnasal drip, rhinorrhea, sinus pressure and voice change. Negative for sore throat and trouble swallowing.    Eyes: Negative.    Respiratory: Positive for cough, chest tightness and shortness of breath. Negative for wheezing.    Cardiovascular: Negative for chest pain, palpitations and leg swelling.   Gastrointestinal: Negative for abdominal pain, constipation, diarrhea, nausea and vomiting.   Endocrine: Negative.    Genitourinary: Negative for dysuria.   Musculoskeletal: Negative for arthralgias and myalgias.   Skin: Negative for  rash.   Allergic/Immunologic: Negative.    Neurological: Negative.    Hematological: Negative.    Psychiatric/Behavioral: Negative.        Objective   Physical Exam   Constitutional: She is oriented to person, place, and time. She appears well-developed and well-nourished. No distress.   HENT:   Head: Normocephalic and atraumatic.   Right Ear: External ear normal.   Left Ear: External ear normal.   Nose: Mucosal edema, rhinorrhea and congestion present. Right sinus exhibits maxillary sinus tenderness. Left sinus exhibits maxillary sinus tenderness.   Mouth/Throat: Oropharynx is clear and moist.   Eyes: Conjunctivae are normal.   Neck: Normal range of motion. Neck supple.   Cardiovascular: Normal rate, regular rhythm and normal heart sounds.   Pulmonary/Chest: Effort normal. No stridor. No respiratory distress. She has wheezes in the right upper field, the right middle field, the right lower field, the left upper field, the left middle field and the left lower field. She has no rhonchi. She has no rales.   Dry cough noted on exam     Abdominal: Soft. Bowel sounds are normal. She exhibits no distension and no mass. There is no tenderness. There is no rebound and no guarding.   Musculoskeletal: Normal range of motion. She exhibits no edema or tenderness.   Lymphadenopathy:     She has no cervical adenopathy.   Neurological: She is alert and oriented to person, place, and time.   Skin: Skin is warm and dry. No rash noted. She is not diaphoretic. No erythema. No pallor.   Psychiatric: She has a normal mood and affect. Her behavior is normal. Judgment and thought content normal.   Nursing note and vitals reviewed.        Assessment/Plan   Nicol was seen today for cough.    Diagnoses and all orders for this visit:    Acute non-recurrent maxillary sinusitis    Bronchitis  -     XR Chest 2 View; Future    Other orders  -     predniSONE (DELTASONE) 20 MG tablet; Take 1 tablet by mouth 2 (Two) Times a Day for 5 days.  -      ipratropium-albuterol (DUO-NEB) 0.5-2.5 mg/3 ml nebulizer; Take 3 mL by nebulization Every 4 (Four) Hours As Needed for Wheezing.  -     promethazine-dextromethorphan (PROMETHAZINE-DM) 6.25-15 MG/5ML syrup; Take 5 mL by mouth 4 (Four) Times a Day As Needed for Cough.    1.  Bronchitis- chest x-ray.  Will call with results.  Prednisone 20 mg 1 tablet twice a day for 5 days.  DuoNeb nebulizer treatment every 4 hours as needed for shortness of breath and wheezing.  Patient has home nebulizer equipment.  Promethazine DM 5 mL's up to 4 times a day as needed for cough.    2.  Acute nonrecurrent maxillary sinusitis- will wait on chest x-ray results before making a determination of antibiotic therapy.  Patient will be called and notified of x-ray results and antibiotic prescription.  Patient verbalized understanding of instruction and agrees with plan of care.    3.  Follow-up in 3 to 4 days if there is no improvement symptoms or sooner symptoms worsen.            This document has been electronically signed by ARIANNA Lo on January 3, 2020 9:31 AM

## 2020-01-03 NOTE — TELEPHONE ENCOUNTER
Nicol Humphrey was called to let her know an alternate prescription was sent to pharmacy.    ----- Message from ARIANNA Lo sent at 1/3/2020  2:54 PM CST -----  Regarding: RE: medication  Bromfed-DM sent to pharmacy.  ----- Message -----  From: Debbie Her MA  Sent: 1/3/2020   2:25 PM CST  To: ARIANNA Lo  Subject: medication                                       [facility],    Patient states the cough syrup is on pharmaceutical backorder.  Can you send something else?    Thanks so much

## 2020-01-23 RX ORDER — BUSPIRONE HYDROCHLORIDE 10 MG/1
10 TABLET ORAL 3 TIMES DAILY
Qty: 90 TABLET | Refills: 3 | Status: SHIPPED | OUTPATIENT
Start: 2020-01-23 | End: 2020-01-23 | Stop reason: SDUPTHER

## 2020-01-23 RX ORDER — HYDROXYZINE HYDROCHLORIDE 25 MG/1
25 TABLET, FILM COATED ORAL NIGHTLY PRN
Qty: 30 TABLET | Refills: 3 | Status: SHIPPED | OUTPATIENT
Start: 2020-01-23 | End: 2020-01-23 | Stop reason: SDUPTHER

## 2020-01-23 RX ORDER — HYDROXYZINE HYDROCHLORIDE 25 MG/1
25 TABLET, FILM COATED ORAL NIGHTLY PRN
Qty: 30 TABLET | Refills: 3 | Status: SHIPPED | OUTPATIENT
Start: 2020-01-23 | End: 2020-10-06

## 2020-01-23 RX ORDER — BUSPIRONE HYDROCHLORIDE 10 MG/1
10 TABLET ORAL 3 TIMES DAILY
Qty: 90 TABLET | Refills: 1 | Status: SHIPPED | OUTPATIENT
Start: 2020-01-23 | End: 2020-08-06

## 2020-01-27 RX ORDER — PANTOPRAZOLE SODIUM 40 MG/1
40 TABLET, DELAYED RELEASE ORAL DAILY
Qty: 30 TABLET | Refills: 11 | OUTPATIENT
Start: 2020-01-27

## 2020-01-27 RX ORDER — IPRATROPIUM BROMIDE AND ALBUTEROL SULFATE 2.5; .5 MG/3ML; MG/3ML
3 SOLUTION RESPIRATORY (INHALATION) EVERY 4 HOURS PRN
Qty: 360 ML | Refills: 0 | Status: SHIPPED | OUTPATIENT
Start: 2020-01-27 | End: 2020-02-21

## 2020-02-14 ENCOUNTER — OFFICE VISIT (OUTPATIENT)
Dept: FAMILY MEDICINE CLINIC | Facility: CLINIC | Age: 35
End: 2020-02-14

## 2020-02-14 ENCOUNTER — TELEPHONE (OUTPATIENT)
Dept: FAMILY MEDICINE CLINIC | Facility: CLINIC | Age: 35
End: 2020-02-14

## 2020-02-14 VITALS
HEART RATE: 91 BPM | WEIGHT: 260.1 LBS | HEIGHT: 67 IN | DIASTOLIC BLOOD PRESSURE: 80 MMHG | SYSTOLIC BLOOD PRESSURE: 130 MMHG | BODY MASS INDEX: 40.82 KG/M2 | OXYGEN SATURATION: 99 % | RESPIRATION RATE: 20 BRPM

## 2020-02-14 DIAGNOSIS — K59.04 CHRONIC IDIOPATHIC CONSTIPATION: ICD-10-CM

## 2020-02-14 DIAGNOSIS — R39.198 DIFFICULTY URINATING: Primary | ICD-10-CM

## 2020-02-14 LAB
BILIRUB BLD-MCNC: NEGATIVE MG/DL
CLARITY, POC: CLEAR
COLOR UR: ABNORMAL
GLUCOSE UR STRIP-MCNC: NEGATIVE MG/DL
KETONES UR QL: NEGATIVE
LEUKOCYTE EST, POC: NEGATIVE
NITRITE UR-MCNC: NEGATIVE MG/ML
PH UR: 5 [PH] (ref 5–8)
PROT UR STRIP-MCNC: NEGATIVE MG/DL
RBC # UR STRIP: NEGATIVE /UL
SP GR UR: 1 (ref 1–1.03)
UROBILINOGEN UR QL: NORMAL

## 2020-02-14 PROCEDURE — 99214 OFFICE O/P EST MOD 30 MIN: CPT | Performed by: NURSE PRACTITIONER

## 2020-02-14 NOTE — PROGRESS NOTES
"Subjective   Nicol Humphrey is a 34 y.o. female.     Ms. Humphrey is a 34-year-old female who presents today for evaluation of dysuria and constipation.  Patient states she has had difficulty urinating for the past 3 weeks.  She states the most prominent issue she has is initiating a stream.  When she does so she is able to urinate.  She denies burning, stinging, hematuria, back pain, fever, chills, nausea, vomiting, diarrhea.  She denies any previous episodes similar to this.  She also reports having chronic constipation that is been ongoing for the last 3 years.  She states sometimes she will go \"weeks\" without having a bowel movement.  At that point she will take something over-the-counter and able to have a bowel movement.       The following portions of the patient's history were reviewed and updated as appropriate: allergies, current medications, past family history, past medical history, past social history, past surgical history and problem list.    Review of Systems   Constitutional: Negative for activity change, appetite change, fatigue, unexpected weight gain and unexpected weight loss.   HENT: Negative for congestion, sore throat, trouble swallowing and voice change.    Eyes: Negative.    Respiratory: Negative for cough, chest tightness, shortness of breath and wheezing.    Cardiovascular: Negative for chest pain, palpitations and leg swelling.   Gastrointestinal: Positive for constipation. Negative for abdominal pain, diarrhea, nausea and vomiting.   Endocrine: Negative.  Negative for cold intolerance, heat intolerance, polydipsia, polyphagia and polyuria.   Genitourinary: Positive for difficulty urinating. Negative for decreased urine volume, dysuria, flank pain, frequency, hematuria, pelvic pain, pelvic pressure, urgency, vaginal discharge and vaginal pain.   Musculoskeletal: Negative for arthralgias and myalgias.   Skin: Negative for rash.   Allergic/Immunologic: Negative.    Neurological: Negative.  "   Hematological: Negative.    Psychiatric/Behavioral: Negative.        Objective   Physical Exam   Constitutional: She is oriented to person, place, and time. She appears well-developed and well-nourished. No distress.   HENT:   Head: Normocephalic and atraumatic.   Eyes: Conjunctivae are normal.   Neck: Normal range of motion.   Cardiovascular: Normal rate, regular rhythm and normal heart sounds.   Pulmonary/Chest: Effort normal and breath sounds normal. No respiratory distress. She has no wheezes. She has no rales.   Abdominal: Soft. Normal appearance. She exhibits no distension and no mass. Bowel sounds are decreased. There is generalized tenderness. There is no rebound and no guarding. No hernia.   Musculoskeletal: Normal range of motion. She exhibits no edema or tenderness.   Neurological: She is alert and oriented to person, place, and time.   Skin: Skin is warm and dry. No rash noted. She is not diaphoretic. No erythema. No pallor.   Psychiatric: She has a normal mood and affect. Her behavior is normal. Judgment and thought content normal.   Nursing note and vitals reviewed.        Assessment/Plan   Nicol was seen today for difficulty urinating.    Diagnoses and all orders for this visit:    Difficulty urinating  -     POCT urinalysis dipstick, manual    Chronic idiopathic constipation    Other orders  -     linaclotide (LINZESS) 145 MCG capsule capsule; Take 1 capsule by mouth Every Morning Before Breakfast.    1.  Chronic idiopathic constipation-Linzess 145 mcg 1 capsule p.o. daily.  Increase water intake.  We will continue to monitor.    2.  Difficulty urinating- UA with no signs of infection or hematuria.  Will evaluate effect resolving constipation has on dysuria.  If symptoms do not improve may consider urology referral.    3.  Follow-up in 1 week if there is no improvement symptoms or sooner symptoms worsen.            This document has been electronically signed by ARIANNA Lo on  February 14, 2020 2:41 PM

## 2020-02-14 NOTE — TELEPHONE ENCOUNTER
PT WAS SEEN TODAY. SHE STATES D[facility]CE WAS CALLED IN. SHE STATES ITS OVER $700 AND NEEDS TO BE CHANGED

## 2020-02-21 RX ORDER — IPRATROPIUM BROMIDE AND ALBUTEROL SULFATE 2.5; .5 MG/3ML; MG/3ML
SOLUTION RESPIRATORY (INHALATION)
Qty: 360 ML | Refills: 0 | Status: SHIPPED | OUTPATIENT
Start: 2020-02-21 | End: 2020-03-16

## 2020-03-16 RX ORDER — IPRATROPIUM BROMIDE AND ALBUTEROL SULFATE 2.5; .5 MG/3ML; MG/3ML
SOLUTION RESPIRATORY (INHALATION)
Qty: 360 ML | Refills: 0 | Status: SHIPPED | OUTPATIENT
Start: 2020-03-16 | End: 2020-04-13

## 2020-03-23 ENCOUNTER — DOCUMENTATION (OUTPATIENT)
Dept: FAMILY MEDICINE CLINIC | Facility: CLINIC | Age: 35
End: 2020-03-23

## 2020-03-30 RX ORDER — VENLAFAXINE HYDROCHLORIDE 75 MG/1
CAPSULE, EXTENDED RELEASE ORAL
Qty: 90 CAPSULE | Refills: 2 | Status: SHIPPED | OUTPATIENT
Start: 2020-03-30 | End: 2020-12-16

## 2020-04-13 RX ORDER — IPRATROPIUM BROMIDE AND ALBUTEROL SULFATE 2.5; .5 MG/3ML; MG/3ML
SOLUTION RESPIRATORY (INHALATION)
Qty: 360 ML | Refills: 0 | Status: SHIPPED | OUTPATIENT
Start: 2020-04-13 | End: 2020-05-11

## 2020-04-14 ENCOUNTER — TELEMEDICINE (OUTPATIENT)
Dept: FAMILY MEDICINE CLINIC | Facility: CLINIC | Age: 35
End: 2020-04-14

## 2020-04-14 DIAGNOSIS — K21.9 GASTROESOPHAGEAL REFLUX DISEASE WITHOUT ESOPHAGITIS: ICD-10-CM

## 2020-04-14 DIAGNOSIS — E89.2 S/P PARATHYROIDECTOMY (HCC): Primary | ICD-10-CM

## 2020-04-14 DIAGNOSIS — F41.1 GENERALIZED ANXIETY DISORDER: ICD-10-CM

## 2020-04-14 DIAGNOSIS — F33.41 RECURRENT MAJOR DEPRESSIVE DISORDER, IN PARTIAL REMISSION (HCC): ICD-10-CM

## 2020-04-14 DIAGNOSIS — K59.04 CHRONIC IDIOPATHIC CONSTIPATION: ICD-10-CM

## 2020-04-14 PROCEDURE — 99214 OFFICE O/P EST MOD 30 MIN: CPT | Performed by: NURSE PRACTITIONER

## 2020-04-14 RX ORDER — ERGOCALCIFEROL 1.25 MG/1
50000 CAPSULE ORAL WEEKLY
Qty: 4 CAPSULE | Refills: 3 | Status: SHIPPED | OUTPATIENT
Start: 2020-04-14 | End: 2020-07-22

## 2020-04-14 NOTE — PROGRESS NOTES
Subjective   Nicol Humphrey is a 34 y.o. female.     Ms. Humphrey is a 34-year-old female who presents today via video visit for follow-up related to postsurgical parathyroidectomy status, anxiety, depression, constipation, GERD.  Patient continues to take calcium and vitamin D daily as prescribed.  Patient reports anxiety and depression well controlled with no suicidal or homicidal ideations.  Constipation is not controlled patient reports still having issues.  She did note a slight improvement in constipation after starting Linzess 145 mcg p.o. daily but still has issues.  GERD well controlled with PPI.       The following portions of the patient's history were reviewed and updated as appropriate: allergies, current medications, past family history, past medical history, past social history, past surgical history and problem list.    Review of Systems   Constitutional: Negative for activity change, appetite change, fatigue, unexpected weight gain and unexpected weight loss.   HENT: Negative for congestion, sore throat, trouble swallowing and voice change.    Eyes: Negative.    Respiratory: Negative for cough, chest tightness, shortness of breath and wheezing.    Cardiovascular: Negative for chest pain, palpitations and leg swelling.   Gastrointestinal: Positive for constipation. Negative for abdominal pain, diarrhea, nausea, vomiting and GERD.   Endocrine: Negative.  Negative for cold intolerance, heat intolerance, polydipsia, polyphagia and polyuria.   Genitourinary: Negative for dysuria.   Musculoskeletal: Negative for arthralgias and myalgias.   Skin: Negative for rash.   Allergic/Immunologic: Negative.    Neurological: Negative.    Hematological: Negative.    Psychiatric/Behavioral: Negative.  Negative for depressed mood and stress. The patient is not nervous/anxious.        Objective   Physical Exam   Constitutional: She is oriented to person, place, and time. She appears well-developed and well-nourished. She  is cooperative. She is easily aroused.  Non-toxic appearance. She does not have a sickly appearance. She does not appear ill. No distress.   HENT:   Head: Normocephalic and atraumatic.   Neck: Normal range of motion.   Pulmonary/Chest: Effort normal. No respiratory distress.   Abdominal:   Unable to assess due to video visit.   Neurological: She is alert, oriented to person, place, and time and easily aroused.   Skin: She is not diaphoretic. No pallor.   Psychiatric: She has a normal mood and affect. Her behavior is normal. Judgment and thought content normal.   Nursing note and vitals reviewed.        Assessment/Plan   Nicol was seen today for other, anxiety, depression, constipation and heartburn.    Diagnoses and all orders for this visit:    S/P parathyroidectomy  -     CBC & Differential; Future  -     Vitamin D 25 Hydroxy; Future  -     Comprehensive Metabolic Panel; Future    Generalized anxiety disorder    Recurrent major depressive disorder, in partial remission (CMS/[facility])    Chronic idiopathic constipation    Gastroesophageal reflux disease without esophagitis    Other orders  -     linaclotide (LINZESS) 290 MCG capsule capsule; Take 1 capsule by mouth Every Morning Before Breakfast.  -     vitamin D (ERGOCALCIFEROL) 1.25 MG (88688 UT) capsule capsule; Take 1 capsule by mouth 1 (One) Time Per Week.    1.  S/p parathyroidectomy-CBC, vitamin D, CMP.  Will call results.  Refill vitamin D 50,000 units 1 capsule weekly.    2.  Generalized anxiety disorder-controlled.  No suicidal homicidal ideations.  We will continue to monitor.    3.  Recurrent major depressive disorder in partial remission-controlled.  No suicidal homicidal ideations.  We will continue to monitor.    4.  Chronic idiopathic constipation-increase Linzess to 290 mcg p.o. daily.  Increase water and fiber intake.    5.  GERD without esophagitis-controlled.  We will continue to monitor.    6.  Follow-up in 6 months or sooner if  needed.            This document has been electronically signed by ARIANNA Lo on April 14, 2020 09:34    This was an audio and video enabled telemedicine encounter. The time that was spent in reviewing the patient's chart and addressing their symptoms, diagnosis and treatment was 20 mins.

## 2020-05-11 RX ORDER — IPRATROPIUM BROMIDE AND ALBUTEROL SULFATE 2.5; .5 MG/3ML; MG/3ML
SOLUTION RESPIRATORY (INHALATION)
Qty: 360 ML | Refills: 0 | Status: SHIPPED | OUTPATIENT
Start: 2020-05-11 | End: 2020-06-04

## 2020-06-04 RX ORDER — IPRATROPIUM BROMIDE AND ALBUTEROL SULFATE 2.5; .5 MG/3ML; MG/3ML
SOLUTION RESPIRATORY (INHALATION)
Qty: 360 ML | Refills: 0 | OUTPATIENT
Start: 2020-06-04 | End: 2020-06-11

## 2020-06-22 RX ORDER — IPRATROPIUM BROMIDE AND ALBUTEROL SULFATE 2.5; .5 MG/3ML; MG/3ML
SOLUTION RESPIRATORY (INHALATION)
Qty: 360 ML | Refills: 0 | OUTPATIENT
Start: 2020-06-22

## 2020-07-22 RX ORDER — ERGOCALCIFEROL 1.25 MG/1
50000 CAPSULE ORAL WEEKLY
Qty: 12 CAPSULE | Refills: 3 | Status: SHIPPED | OUTPATIENT
Start: 2020-07-22 | End: 2021-07-01

## 2020-07-31 RX ORDER — LINACLOTIDE 290 UG/1
CAPSULE, GELATIN COATED ORAL
Qty: 30 CAPSULE | Refills: 3 | Status: SHIPPED | OUTPATIENT
Start: 2020-07-31 | End: 2020-12-21

## 2020-08-06 RX ORDER — BUSPIRONE HYDROCHLORIDE 10 MG/1
TABLET ORAL
Qty: 90 TABLET | Refills: 1 | Status: SHIPPED | OUTPATIENT
Start: 2020-08-06 | End: 2020-10-06

## 2020-10-06 RX ORDER — HYDROXYZINE HYDROCHLORIDE 25 MG/1
25 TABLET, FILM COATED ORAL NIGHTLY PRN
Qty: 30 TABLET | Refills: 0 | Status: SHIPPED | OUTPATIENT
Start: 2020-10-06 | End: 2020-10-23

## 2020-10-06 RX ORDER — BUSPIRONE HYDROCHLORIDE 10 MG/1
TABLET ORAL
Qty: 90 TABLET | Refills: 0 | Status: SHIPPED | OUTPATIENT
Start: 2020-10-06 | End: 2020-10-30

## 2020-10-23 ENCOUNTER — OFFICE VISIT (OUTPATIENT)
Dept: FAMILY MEDICINE CLINIC | Facility: CLINIC | Age: 35
End: 2020-10-23

## 2020-10-23 ENCOUNTER — LAB (OUTPATIENT)
Dept: LAB | Facility: HOSPITAL | Age: 35
End: 2020-10-23

## 2020-10-23 VITALS
OXYGEN SATURATION: 97 % | SYSTOLIC BLOOD PRESSURE: 140 MMHG | WEIGHT: 246.5 LBS | BODY MASS INDEX: 37.36 KG/M2 | HEART RATE: 89 BPM | RESPIRATION RATE: 20 BRPM | DIASTOLIC BLOOD PRESSURE: 80 MMHG | HEIGHT: 68 IN

## 2020-10-23 DIAGNOSIS — F51.01 PRIMARY INSOMNIA: ICD-10-CM

## 2020-10-23 DIAGNOSIS — E55.9 VITAMIN D DEFICIENCY: ICD-10-CM

## 2020-10-23 DIAGNOSIS — Z00.00 ANNUAL PHYSICAL EXAM: ICD-10-CM

## 2020-10-23 DIAGNOSIS — F41.1 GENERALIZED ANXIETY DISORDER: ICD-10-CM

## 2020-10-23 DIAGNOSIS — K59.04 CHRONIC IDIOPATHIC CONSTIPATION: ICD-10-CM

## 2020-10-23 DIAGNOSIS — Z00.00 ANNUAL PHYSICAL EXAM: Primary | ICD-10-CM

## 2020-10-23 DIAGNOSIS — K21.9 GASTROESOPHAGEAL REFLUX DISEASE WITHOUT ESOPHAGITIS: ICD-10-CM

## 2020-10-23 DIAGNOSIS — E66.09 CLASS 2 OBESITY DUE TO EXCESS CALORIES WITHOUT SERIOUS COMORBIDITY WITH BODY MASS INDEX (BMI) OF 37.0 TO 37.9 IN ADULT: ICD-10-CM

## 2020-10-23 DIAGNOSIS — Z23 NEED FOR IMMUNIZATION AGAINST INFLUENZA: ICD-10-CM

## 2020-10-23 DIAGNOSIS — E89.2 S/P PARATHYROIDECTOMY (HCC): ICD-10-CM

## 2020-10-23 DIAGNOSIS — F33.41 RECURRENT MAJOR DEPRESSIVE DISORDER, IN PARTIAL REMISSION (HCC): ICD-10-CM

## 2020-10-23 PROCEDURE — 84439 ASSAY OF FREE THYROXINE: CPT

## 2020-10-23 PROCEDURE — 80061 LIPID PANEL: CPT

## 2020-10-23 PROCEDURE — 82306 VITAMIN D 25 HYDROXY: CPT

## 2020-10-23 PROCEDURE — 80050 GENERAL HEALTH PANEL: CPT

## 2020-10-23 PROCEDURE — 83036 HEMOGLOBIN GLYCOSYLATED A1C: CPT

## 2020-10-23 PROCEDURE — 90471 IMMUNIZATION ADMIN: CPT | Performed by: NURSE PRACTITIONER

## 2020-10-23 PROCEDURE — 99214 OFFICE O/P EST MOD 30 MIN: CPT | Performed by: NURSE PRACTITIONER

## 2020-10-23 PROCEDURE — 90686 IIV4 VACC NO PRSV 0.5 ML IM: CPT | Performed by: NURSE PRACTITIONER

## 2020-10-23 PROCEDURE — 36415 COLL VENOUS BLD VENIPUNCTURE: CPT

## 2020-10-23 RX ORDER — HYDROXYZINE 50 MG/1
50 TABLET, FILM COATED ORAL NIGHTLY PRN
Qty: 30 TABLET | Refills: 1 | Status: SHIPPED | OUTPATIENT
Start: 2020-10-23 | End: 2020-10-30 | Stop reason: SDUPTHER

## 2020-10-23 RX ORDER — DOCUSATE SODIUM 100 MG/1
100 CAPSULE, LIQUID FILLED ORAL 2 TIMES DAILY PRN
Qty: 60 CAPSULE | Refills: 5 | Status: SHIPPED | OUTPATIENT
Start: 2020-10-23 | End: 2021-04-20

## 2020-10-23 NOTE — PROGRESS NOTES
Subjective   Nicol Humphrey is a 35 y.o. female.     Heartburn  She reports no abdominal pain, no chest pain, no choking, no coughing, no heartburn, no nausea, no sore throat or no wheezing. This is a chronic problem. The current episode started more than 1 year ago. The problem occurs rarely. The symptoms are aggravated by certain foods. Pertinent negatives include no fatigue, melena, muscle weakness, orthopnea or weight loss. Risk factors include obesity. She has tried a diet change for the symptoms. The treatment provided moderate relief.   Anxiety  Presents for follow-up visit. Symptoms include irritability, nervous/anxious behavior and restlessness. Patient reports no chest pain, depressed mood, dizziness, nausea, palpitations, shortness of breath or suicidal ideas. Symptoms occur most days. The severity of symptoms is moderate. The quality of sleep is fair. Nighttime awakenings: occasional.     Her past medical history is significant for depression. Compliance with medications is %.   Depression  Visit Type: follow-up  Patient presents with the following symptoms: irritability, nervousness/anxiety and restlessness.  Patient is not experiencing: choking sensation, depressed mood, feelings of hopelessness, feelings of worthlessness, palpitations, shortness of breath, suicidal ideas, suicidal planning, thoughts of death, weight gain and weight loss.  Severity: mild   Sleep quality: fair  Nighttime awakenings: occasional  Compliance with medications:  %        Constipation  This is a chronic problem. The current episode started more than 1 year ago. The problem has been waxing and waning since onset. Her stool frequency is 2 to 3 times per week. The stool is described as formed and firm. The patient is not on a high fiber diet. She does not exercise regularly. There has not been adequate water intake. Associated symptoms include flatus. Pertinent negatives include no abdominal pain, back pain,  diarrhea, fecal incontinence, fever, melena, nausea, vomiting or weight loss. Risk factors include obesity and stress. She has tried laxatives, stool softeners and diet changes for the symptoms. The treatment provided moderate relief.   Obesity  This is a chronic problem. The current episode started more than 1 year ago. The problem occurs constantly. The problem has been gradually improving. Pertinent negatives include no abdominal pain, arthralgias, chest pain, chills, congestion, coughing, diaphoresis, fatigue, fever, myalgias, nausea, rash, sore throat, vomiting or weakness. The symptoms are aggravated by eating. She has tried eating for the symptoms. The treatment provided mild relief.        The following portions of the patient's history were reviewed and updated as appropriate: allergies, current medications, past family history, past medical history, past social history, past surgical history and problem list.    Review of Systems   Constitutional: Positive for irritability. Negative for activity change, appetite change, chills, diaphoresis, fatigue, fever, unexpected weight gain and unexpected weight loss.   HENT: Negative for congestion, sore throat, trouble swallowing and voice change.    Eyes: Negative.    Respiratory: Negative for cough, choking, chest tightness, shortness of breath and wheezing.    Cardiovascular: Negative for chest pain, palpitations and leg swelling.   Gastrointestinal: Positive for constipation and flatus. Negative for abdominal distention, abdominal pain, anal bleeding, blood in stool, diarrhea, melena, nausea, vomiting, GERD and indigestion.   Endocrine: Negative.    Genitourinary: Negative for dysuria, frequency, hematuria and urgency.   Musculoskeletal: Negative for arthralgias, back pain, myalgias and muscle weakness.   Skin: Negative for rash.   Allergic/Immunologic: Negative.    Neurological: Negative for dizziness, syncope, weakness, light-headedness and headache.    Hematological: Negative.    Psychiatric/Behavioral: Positive for sleep disturbance. Negative for suicidal ideas and depressed mood. The patient is nervous/anxious.        Objective   Physical Exam  Vitals signs and nursing note reviewed.   Constitutional:       General: She is not in acute distress.     Appearance: She is well-developed. She is not diaphoretic.   HENT:      Head: Normocephalic and atraumatic.      Right Ear: External ear normal.      Left Ear: External ear normal.      Nose: Nose normal.   Eyes:      Conjunctiva/sclera: Conjunctivae normal.      Pupils: Pupils are equal, round, and reactive to light.   Neck:      Musculoskeletal: Normal range of motion and neck supple.      Thyroid: No thyromegaly.      Trachea: No tracheal deviation.   Cardiovascular:      Rate and Rhythm: Normal rate and regular rhythm.      Heart sounds: Normal heart sounds. No murmur. No friction rub. No gallop.    Pulmonary:      Effort: Pulmonary effort is normal. No respiratory distress.      Breath sounds: Normal breath sounds. No stridor. No wheezing or rales.   Abdominal:      General: Bowel sounds are decreased. There is no distension.      Palpations: Abdomen is soft. There is no mass.      Tenderness: There is no abdominal tenderness. There is no guarding or rebound.      Hernia: No hernia is present.   Musculoskeletal: Normal range of motion.         General: No tenderness.   Lymphadenopathy:      Cervical: No cervical adenopathy.   Skin:     General: Skin is warm and dry.      Coloration: Skin is not pale.      Findings: No erythema or rash.   Neurological:      Mental Status: She is alert and oriented to person, place, and time.      Cranial Nerves: No cranial nerve deficit.      Coordination: Coordination normal.   Psychiatric:         Attention and Perception: Attention and perception normal.         Mood and Affect: Affect normal. Mood is anxious (mild).         Speech: Speech normal.         Behavior: Behavior  normal. Behavior is cooperative.         Thought Content: Thought content normal. Thought content is not paranoid or delusional. Thought content does not include homicidal or suicidal ideation. Thought content does not include homicidal or suicidal plan.         Cognition and Memory: Cognition and memory normal.         Judgment: Judgment normal.           Assessment/Plan   Diagnoses and all orders for this visit:    1. Annual physical exam (Primary)  -     CBC & Differential; Future  -     Comprehensive Metabolic Panel; Future  -     Hemoglobin A1c; Future  -     Lipid Panel; Future  -     TSH; Future  -     T4, Free; Future  -     Vitamin D 25 Hydroxy; Future   -Will call with results.    2. Need for immunization against influenza  -     FluLaval Quad >6 Months ([id number]), tolerated well with no adverse reaction.    3. Gastroesophageal reflux disease without esophagitis   -Controlled.  We will continue to monitor.    4. Vitamin D deficiency  -    Vitamin D level.  Will call with results.    5. Recurrent major depressive disorder, in partial remission (CMS/HCC)   -Controlled with no suicidal homicidal ideations.  We will continue to monitor.    6. Generalized anxiety disorder   -Increase hydroxyzine to 50 mg 1 tablet nightly as needed.  Instructed to take with caution.  Do not mix with other sedating medications or alcohol.  Will call with results.    7. Chronic idiopathic constipation   -Increase water and fiber intake.  Colace twice daily as needed.   -     docusate sodium (COLACE) 100 MG capsule; Take 1 capsule by mouth 2 (Two) Times a Day As Needed for Constipation.  Dispense: 60 capsule; Refill: 5    8. Class 2 obesity due to excess calories without serious comorbidity with body mass index (BMI) of 37.0 to 37.9 in adult   -Approximate 15 pound weight loss in the last 3 months.  Continue lifestyle modifications.  We will continue to monitor.    9. Primary Insomnia   -     hydrOXYzine (ATARAX) 50 MG tablet;  Take 1 tablet by mouth At Night As Needed (sleep).  Dispense: 30 tablet; Refill: 1.  Take with caution.  Do not work drive or operate machinery while taking medication.  Do not mix with other sedating medications or alcohol.  We will continue to monitor.    10.  Follow-up in 6 months or sooner for any acute needs.            This document has been electronically signed by ARIANNA Lo on October 23, 2020 16:58 CDT

## 2020-10-24 LAB
25(OH)D3 SERPL-MCNC: 43.1 NG/ML (ref 30–100)
ALBUMIN SERPL-MCNC: 4.5 G/DL (ref 3.5–5.2)
ALBUMIN/GLOB SERPL: 2 G/DL
ALP SERPL-CCNC: 72 U/L (ref 39–117)
ALT SERPL W P-5'-P-CCNC: 13 U/L (ref 1–33)
ANION GAP SERPL CALCULATED.3IONS-SCNC: 10.9 MMOL/L (ref 5–15)
AST SERPL-CCNC: 14 U/L (ref 1–32)
BASOPHILS # BLD AUTO: 0.02 10*3/MM3 (ref 0–0.2)
BASOPHILS NFR BLD AUTO: 0.3 % (ref 0–1.5)
BILIRUB SERPL-MCNC: 0.2 MG/DL (ref 0–1.2)
BUN SERPL-MCNC: 10 MG/DL (ref 6–20)
BUN/CREAT SERPL: 15.6 (ref 7–25)
CALCIUM SPEC-SCNC: 9 MG/DL (ref 8.6–10.5)
CHLORIDE SERPL-SCNC: 104 MMOL/L (ref 98–107)
CHOLEST SERPL-MCNC: 163 MG/DL (ref 0–200)
CO2 SERPL-SCNC: 27.1 MMOL/L (ref 22–29)
CREAT SERPL-MCNC: 0.64 MG/DL (ref 0.57–1)
DEPRECATED RDW RBC AUTO: 40.1 FL (ref 37–54)
EOSINOPHIL # BLD AUTO: 0.09 10*3/MM3 (ref 0–0.4)
EOSINOPHIL NFR BLD AUTO: 1.3 % (ref 0.3–6.2)
ERYTHROCYTE [DISTWIDTH] IN BLOOD BY AUTOMATED COUNT: 11.7 % (ref 12.3–15.4)
GFR SERPL CREATININE-BSD FRML MDRD: 106 ML/MIN/1.73
GLOBULIN UR ELPH-MCNC: 2.3 GM/DL
GLUCOSE SERPL-MCNC: 77 MG/DL (ref 65–99)
HBA1C MFR BLD: 5.2 % (ref 4.8–5.6)
HCT VFR BLD AUTO: 36.1 % (ref 34–46.6)
HDLC SERPL-MCNC: 50 MG/DL (ref 40–60)
HGB BLD-MCNC: 12.3 G/DL (ref 12–15.9)
IMM GRANULOCYTES # BLD AUTO: 0.02 10*3/MM3 (ref 0–0.05)
IMM GRANULOCYTES NFR BLD AUTO: 0.3 % (ref 0–0.5)
LDLC SERPL CALC-MCNC: 92 MG/DL (ref 0–100)
LDLC/HDLC SERPL: 1.78 {RATIO}
LYMPHOCYTES # BLD AUTO: 2.63 10*3/MM3 (ref 0.7–3.1)
LYMPHOCYTES NFR BLD AUTO: 39 % (ref 19.6–45.3)
MCH RBC QN AUTO: 32 PG (ref 26.6–33)
MCHC RBC AUTO-ENTMCNC: 34.1 G/DL (ref 31.5–35.7)
MCV RBC AUTO: 94 FL (ref 79–97)
MONOCYTES # BLD AUTO: 0.46 10*3/MM3 (ref 0.1–0.9)
MONOCYTES NFR BLD AUTO: 6.8 % (ref 5–12)
NEUTROPHILS NFR BLD AUTO: 3.53 10*3/MM3 (ref 1.7–7)
NEUTROPHILS NFR BLD AUTO: 52.3 % (ref 42.7–76)
NRBC BLD AUTO-RTO: 0 /100 WBC (ref 0–0.2)
PLATELET # BLD AUTO: 386 10*3/MM3 (ref 140–450)
PMV BLD AUTO: 10 FL (ref 6–12)
POTASSIUM SERPL-SCNC: 4.6 MMOL/L (ref 3.5–5.2)
PROT SERPL-MCNC: 6.8 G/DL (ref 6–8.5)
RBC # BLD AUTO: 3.84 10*6/MM3 (ref 3.77–5.28)
SODIUM SERPL-SCNC: 142 MMOL/L (ref 136–145)
T4 FREE SERPL-MCNC: 1.13 NG/DL (ref 0.93–1.7)
TRIGL SERPL-MCNC: 119 MG/DL (ref 0–150)
TSH SERPL DL<=0.05 MIU/L-ACNC: 1.56 UIU/ML (ref 0.27–4.2)
VLDLC SERPL-MCNC: 21 MG/DL (ref 5–40)
WBC # BLD AUTO: 6.75 10*3/MM3 (ref 3.4–10.8)

## 2020-10-30 RX ORDER — BUSPIRONE HYDROCHLORIDE 10 MG/1
TABLET ORAL
Qty: 270 TABLET | Refills: 2 | Status: SHIPPED | OUTPATIENT
Start: 2020-10-30 | End: 2021-07-30

## 2020-10-30 RX ORDER — HYDROXYZINE HYDROCHLORIDE 25 MG/1
25 TABLET, FILM COATED ORAL NIGHTLY PRN
Qty: 30 TABLET | Refills: 0 | Status: SHIPPED | OUTPATIENT
Start: 2020-10-30 | End: 2020-12-28 | Stop reason: SDUPTHER

## 2020-12-16 RX ORDER — VENLAFAXINE HYDROCHLORIDE 75 MG/1
CAPSULE, EXTENDED RELEASE ORAL
Qty: 90 CAPSULE | Refills: 2 | Status: SHIPPED | OUTPATIENT
Start: 2020-12-16 | End: 2021-09-08

## 2020-12-21 RX ORDER — LINACLOTIDE 290 UG/1
CAPSULE, GELATIN COATED ORAL
Qty: 30 CAPSULE | Refills: 3 | Status: SHIPPED | OUTPATIENT
Start: 2020-12-21 | End: 2021-04-21

## 2020-12-28 RX ORDER — HYDROXYZINE HYDROCHLORIDE 25 MG/1
25 TABLET, FILM COATED ORAL NIGHTLY PRN
Qty: 30 TABLET | Refills: 5 | Status: SHIPPED | OUTPATIENT
Start: 2020-12-28 | End: 2021-04-14 | Stop reason: SDUPTHER

## 2021-04-15 RX ORDER — HYDROXYZINE HYDROCHLORIDE 25 MG/1
25 TABLET, FILM COATED ORAL NIGHTLY PRN
Qty: 30 TABLET | Refills: 5 | Status: SHIPPED | OUTPATIENT
Start: 2021-04-15 | End: 2021-10-04

## 2021-04-20 RX ORDER — DOCUSATE SODIUM 100 MG/1
100 CAPSULE, LIQUID FILLED ORAL 2 TIMES DAILY PRN
Qty: 60 CAPSULE | Refills: 5 | Status: SHIPPED | OUTPATIENT
Start: 2021-04-20 | End: 2021-10-12

## 2021-04-21 RX ORDER — LINACLOTIDE 290 UG/1
CAPSULE, GELATIN COATED ORAL
Qty: 30 CAPSULE | Refills: 3 | Status: SHIPPED | OUTPATIENT
Start: 2021-04-21 | End: 2021-08-23

## 2021-07-01 RX ORDER — ERGOCALCIFEROL 1.25 MG/1
50000 CAPSULE ORAL WEEKLY
Qty: 4 CAPSULE | Refills: 11 | Status: SHIPPED | OUTPATIENT
Start: 2021-07-01 | End: 2021-09-30 | Stop reason: SDUPTHER

## 2021-07-30 RX ORDER — BUSPIRONE HYDROCHLORIDE 10 MG/1
TABLET ORAL
Qty: 90 TABLET | Refills: 0 | Status: SHIPPED | OUTPATIENT
Start: 2021-07-30 | End: 2021-08-23

## 2021-08-19 DIAGNOSIS — U07.1 COVID-19: Primary | ICD-10-CM

## 2021-08-19 RX ORDER — DEXAMETHASONE 6 MG/1
6 TABLET ORAL
Qty: 7 TABLET | Refills: 0 | Status: SHIPPED | OUTPATIENT
Start: 2021-08-19 | End: 2021-08-26

## 2021-08-19 RX ORDER — DEXTROMETHORPHAN HYDROBROMIDE AND PROMETHAZINE HYDROCHLORIDE 15; 6.25 MG/5ML; MG/5ML
5 SYRUP ORAL 4 TIMES DAILY PRN
Qty: 100 ML | Refills: 0 | Status: SHIPPED | OUTPATIENT
Start: 2021-08-19 | End: 2021-09-30

## 2021-08-23 RX ORDER — LINACLOTIDE 290 UG/1
CAPSULE, GELATIN COATED ORAL
Qty: 30 CAPSULE | Refills: 3 | Status: SHIPPED | OUTPATIENT
Start: 2021-08-23 | End: 2022-01-04

## 2021-08-23 RX ORDER — BUSPIRONE HYDROCHLORIDE 10 MG/1
TABLET ORAL
Qty: 90 TABLET | Refills: 0 | Status: SHIPPED | OUTPATIENT
Start: 2021-08-23 | End: 2021-09-20

## 2021-09-08 RX ORDER — VENLAFAXINE HYDROCHLORIDE 75 MG/1
CAPSULE, EXTENDED RELEASE ORAL
Qty: 30 CAPSULE | Refills: 0 | Status: SHIPPED | OUTPATIENT
Start: 2021-09-08 | End: 2021-09-30

## 2021-09-08 NOTE — TELEPHONE ENCOUNTER
Patient was called to let her know a follow up appointment is needed for future refills.   Appointment scheduled.

## 2021-09-20 RX ORDER — BUSPIRONE HYDROCHLORIDE 10 MG/1
TABLET ORAL
Qty: 90 TABLET | Refills: 0 | Status: SHIPPED | OUTPATIENT
Start: 2021-09-20 | End: 2021-10-20

## 2021-09-30 ENCOUNTER — OFFICE VISIT (OUTPATIENT)
Dept: FAMILY MEDICINE CLINIC | Facility: CLINIC | Age: 36
End: 2021-09-30

## 2021-09-30 VITALS
TEMPERATURE: 98 F | OXYGEN SATURATION: 99 % | DIASTOLIC BLOOD PRESSURE: 84 MMHG | BODY MASS INDEX: 36.78 KG/M2 | SYSTOLIC BLOOD PRESSURE: 120 MMHG | WEIGHT: 242.7 LBS | HEART RATE: 86 BPM | HEIGHT: 68 IN | RESPIRATION RATE: 20 BRPM

## 2021-09-30 DIAGNOSIS — F33.41 RECURRENT MAJOR DEPRESSIVE DISORDER, IN PARTIAL REMISSION (HCC): ICD-10-CM

## 2021-09-30 DIAGNOSIS — Z00.00 ANNUAL PHYSICAL EXAM: ICD-10-CM

## 2021-09-30 DIAGNOSIS — K59.04 CHRONIC IDIOPATHIC CONSTIPATION: ICD-10-CM

## 2021-09-30 DIAGNOSIS — E89.2 S/P PARATHYROIDECTOMY (HCC): ICD-10-CM

## 2021-09-30 DIAGNOSIS — F41.1 GENERALIZED ANXIETY DISORDER: Primary | ICD-10-CM

## 2021-09-30 PROCEDURE — 99214 OFFICE O/P EST MOD 30 MIN: CPT | Performed by: NURSE PRACTITIONER

## 2021-09-30 RX ORDER — VENLAFAXINE HYDROCHLORIDE 150 MG/1
150 CAPSULE, EXTENDED RELEASE ORAL DAILY
Qty: 90 CAPSULE | Refills: 3 | Status: SHIPPED | OUTPATIENT
Start: 2021-09-30 | End: 2022-10-06

## 2021-09-30 RX ORDER — ERGOCALCIFEROL 1.25 MG/1
50000 CAPSULE ORAL WEEKLY
Qty: 4 CAPSULE | Refills: 11 | Status: SHIPPED | OUTPATIENT
Start: 2021-09-30 | End: 2023-03-08

## 2021-09-30 NOTE — PROGRESS NOTES
Subjective   Nicol Humphrey is a 36 y.o. female.     CC: Annual follow-up-anxiety, depression, SP parathyroidectomy, constipation    Anxiety  Presents for follow-up visit. Symptoms include depressed mood, excessive worry, nervous/anxious behavior, panic and restlessness. Patient reports no chest pain, dizziness, nausea, palpitations, shortness of breath or suicidal ideas. Symptoms occur most days. The severity of symptoms is moderate, causing significant distress and interfering with daily activities. The quality of sleep is fair. Nighttime awakenings: occasional.     Her past medical history is significant for depression. Compliance with medications is %.   Depression  Visit Type: follow-up  Patient presents with the following symptoms: depressed mood, excessive worry, nervousness/anxiety, panic and restlessness.  Patient is not experiencing: anhedonia, feelings of hopelessness, feelings of worthlessness, palpitations, shortness of breath, suicidal ideas, suicidal planning, thoughts of death, weight gain and weight loss.  Frequency of symptoms: most days   Severity: mild   Sleep quality: fair  Nighttime awakenings: occasional  Compliance with medications:  %        Constipation  This is a chronic problem. The current episode started more than 1 year ago. Her stool frequency is 2 to 3 times per week. Pertinent negatives include no abdominal pain, back pain, diarrhea, fever, nausea, vomiting or weight loss. Risk factors include obesity. She has tried laxatives for the symptoms. The treatment provided significant relief.        The following portions of the patient's history were reviewed and updated as appropriate: allergies, current medications, past family history, past medical history, past social history, past surgical history and problem list.    Review of Systems   Constitutional: Negative for activity change, appetite change, chills, diaphoresis, fatigue, fever, unexpected weight gain and  unexpected weight loss.   HENT: Negative for congestion, sore throat, trouble swallowing and voice change.    Eyes: Negative for blurred vision, double vision, photophobia, pain and visual disturbance.   Respiratory: Negative for cough, chest tightness, shortness of breath and wheezing.    Cardiovascular: Negative for chest pain, palpitations and leg swelling.   Gastrointestinal: Positive for constipation (controlled with linzess). Negative for abdominal distention, abdominal pain, anal bleeding, blood in stool, diarrhea, nausea, vomiting, GERD and indigestion.   Endocrine: Negative for cold intolerance, heat intolerance, polydipsia, polyphagia and polyuria.   Genitourinary: Negative for dysuria, frequency, hematuria and urgency.   Musculoskeletal: Negative for arthralgias, back pain and myalgias.   Skin: Negative for rash.   Allergic/Immunologic: Negative.    Neurological: Negative for dizziness, syncope, weakness, light-headedness and headache.   Hematological: Negative.    Psychiatric/Behavioral: Positive for depressed mood and stress. Negative for suicidal ideas. The patient is nervous/anxious.        Objective   Physical Exam  Vitals and nursing note reviewed.   Constitutional:       General: She is not in acute distress.     Appearance: Normal appearance. She is well-developed. She is obese. She is not ill-appearing, toxic-appearing or diaphoretic.   HENT:      Head: Normocephalic and atraumatic.      Right Ear: External ear normal.      Left Ear: External ear normal.      Nose: Nose normal.   Eyes:      Conjunctiva/sclera: Conjunctivae normal.      Pupils: Pupils are equal, round, and reactive to light.   Neck:      Thyroid: No thyroid mass, thyromegaly or thyroid tenderness.      Trachea: No tracheal deviation.   Cardiovascular:      Rate and Rhythm: Normal rate and regular rhythm.      Heart sounds: Normal heart sounds. No murmur heard.   No friction rub. No gallop.    Pulmonary:      Effort: Pulmonary  effort is normal. No respiratory distress.      Breath sounds: Normal breath sounds. No stridor. No wheezing or rales.   Abdominal:      General: Bowel sounds are normal. There is no distension.      Palpations: Abdomen is soft. There is no mass.      Tenderness: There is no abdominal tenderness. There is no guarding or rebound.      Hernia: No hernia is present.   Musculoskeletal:         General: No tenderness. Normal range of motion.      Cervical back: Normal range of motion and neck supple.   Lymphadenopathy:      Cervical: No cervical adenopathy.   Skin:     General: Skin is warm and dry.      Coloration: Skin is not pale.      Findings: No erythema or rash.   Neurological:      Mental Status: She is alert and oriented to person, place, and time.      Cranial Nerves: No cranial nerve deficit.      Coordination: Coordination normal.   Psychiatric:         Attention and Perception: Attention and perception normal.         Mood and Affect: Affect normal. Mood is anxious.         Speech: Speech normal.         Behavior: Behavior normal. Behavior is cooperative.         Thought Content: Thought content normal. Thought content is not paranoid or delusional. Thought content does not include homicidal or suicidal ideation. Thought content does not include homicidal or suicidal plan.         Cognition and Memory: Cognition and memory normal.         Judgment: Judgment normal.           Assessment/Plan   Diagnoses and all orders for this visit:    1. Generalized anxiety disorder (Primary)  -     venlafaxine XR (EFFEXOR-XR) 150 MG 24 hr capsule; Take 1 capsule by mouth Daily.  Dispense: 90 capsule; Refill: 3   -Patient reports increased anxiety related to concern over her kids going back to school. No suicidal homicidal ideations. Symptoms previously well controlled. Continue BuSpar and hydroxyzine as prescribed. We will increase Effexor to 150 mg daily. Immediately notify office if symptoms worsen. Present to the ER for  suicidal homicidal ideations. Patient verbalized understanding of instruction. We will continue to monitor.    2. Recurrent major depressive disorder, in partial remission (HCC)  -     venlafaxine XR (EFFEXOR-XR) 150 MG 24 hr capsule; Take 1 capsule by mouth Daily.  Dispense: 90 capsule; Refill: 3   -Plan of care stated above #1. We will continue to monitor.    3. S/P parathyroidectomy (CMS/[facility])  -     Comprehensive Metabolic Panel; Future  -     vitamin D (ERGOCALCIFEROL) 1.25 MG (84493 UT) capsule capsule; Take 1 capsule by mouth 1 (One) Time Per Week.  Dispense: 4 capsule; Refill: 11  -     Vitamin D 25 Hydroxy; Future   -Will call with lab results. Continue vitamin D as prescribed. We will continue to monitor.    4. Annual physical exam  -     CBC & Differential; Future  -     Comprehensive Metabolic Panel; Future  -     Hemoglobin A1c; Future  -     Lipid Panel; Future  -     TSH; Future  -     T4, Free; Future  -     Vitamin D 25 Hydroxy; Future, will call with results.    5. Chronic idiopathic constipation   -Well controlled. Continue adequate hydration and Linzess as prescribed. We will continue to monitor.    6. Follow-up in 1 month or sooner for any acute needs.          This document has been electronically signed by ARIANNA Lo on September 30, 2021 09:32 CDT

## 2021-10-04 RX ORDER — HYDROXYZINE HYDROCHLORIDE 25 MG/1
25 TABLET, FILM COATED ORAL NIGHTLY PRN
Qty: 30 TABLET | Refills: 5 | Status: SHIPPED | OUTPATIENT
Start: 2021-10-04 | End: 2022-04-13

## 2021-10-04 RX ORDER — VENLAFAXINE HYDROCHLORIDE 75 MG/1
CAPSULE, EXTENDED RELEASE ORAL
Qty: 30 CAPSULE | Refills: 0 | OUTPATIENT
Start: 2021-10-04

## 2021-10-12 RX ORDER — DOCUSATE SODIUM 100 MG/1
CAPSULE, LIQUID FILLED ORAL
Qty: 60 CAPSULE | Refills: 5 | Status: SHIPPED | OUTPATIENT
Start: 2021-10-12 | End: 2022-04-13

## 2021-10-20 RX ORDER — BUSPIRONE HYDROCHLORIDE 10 MG/1
TABLET ORAL
Qty: 90 TABLET | Refills: 3 | Status: SHIPPED | OUTPATIENT
Start: 2021-10-20

## 2021-11-02 RX ORDER — IPRATROPIUM BROMIDE AND ALBUTEROL SULFATE 2.5; .5 MG/3ML; MG/3ML
SOLUTION RESPIRATORY (INHALATION)
Qty: 360 ML | Refills: 0 | Status: SHIPPED | OUTPATIENT
Start: 2021-11-02 | End: 2022-04-15

## 2021-11-29 RX ORDER — IPRATROPIUM BROMIDE AND ALBUTEROL SULFATE 2.5; .5 MG/3ML; MG/3ML
SOLUTION RESPIRATORY (INHALATION)
Qty: 360 ML | Refills: 0 | OUTPATIENT
Start: 2021-11-29

## 2022-01-04 RX ORDER — LINACLOTIDE 290 UG/1
CAPSULE, GELATIN COATED ORAL
Qty: 90 CAPSULE | Refills: 3 | Status: SHIPPED | OUTPATIENT
Start: 2022-01-04 | End: 2023-01-11

## 2022-04-13 RX ORDER — DOCUSATE SODIUM 100 MG/1
CAPSULE, LIQUID FILLED ORAL
Qty: 60 CAPSULE | Refills: 5 | Status: SHIPPED | OUTPATIENT
Start: 2022-04-13 | End: 2022-10-17

## 2022-04-13 RX ORDER — HYDROXYZINE HYDROCHLORIDE 25 MG/1
25 TABLET, FILM COATED ORAL NIGHTLY PRN
Qty: 30 TABLET | Refills: 5 | Status: SHIPPED | OUTPATIENT
Start: 2022-04-13 | End: 2022-10-17

## 2022-04-15 ENCOUNTER — LAB (OUTPATIENT)
Dept: LAB | Facility: HOSPITAL | Age: 37
End: 2022-04-15

## 2022-04-15 ENCOUNTER — OFFICE VISIT (OUTPATIENT)
Dept: FAMILY MEDICINE CLINIC | Facility: CLINIC | Age: 37
End: 2022-04-15

## 2022-04-15 VITALS
SYSTOLIC BLOOD PRESSURE: 132 MMHG | WEIGHT: 244 LBS | HEART RATE: 86 BPM | RESPIRATION RATE: 18 BRPM | HEIGHT: 68 IN | BODY MASS INDEX: 36.98 KG/M2 | DIASTOLIC BLOOD PRESSURE: 76 MMHG | OXYGEN SATURATION: 96 % | TEMPERATURE: 97.3 F

## 2022-04-15 DIAGNOSIS — Z00.00 ANNUAL PHYSICAL EXAM: ICD-10-CM

## 2022-04-15 DIAGNOSIS — Z00.00 ANNUAL PHYSICAL EXAM: Primary | ICD-10-CM

## 2022-04-15 DIAGNOSIS — F41.1 GENERALIZED ANXIETY DISORDER: ICD-10-CM

## 2022-04-15 DIAGNOSIS — E66.09 CLASS 2 OBESITY DUE TO EXCESS CALORIES WITHOUT SERIOUS COMORBIDITY WITH BODY MASS INDEX (BMI) OF 37.0 TO 37.9 IN ADULT: ICD-10-CM

## 2022-04-15 DIAGNOSIS — F33.41 RECURRENT MAJOR DEPRESSIVE DISORDER, IN PARTIAL REMISSION: ICD-10-CM

## 2022-04-15 DIAGNOSIS — E89.2 S/P PARATHYROIDECTOMY: ICD-10-CM

## 2022-04-15 LAB
AMPHET+METHAMPHET UR QL: NEGATIVE
AMPHETAMINES UR QL: NEGATIVE
BARBITURATES UR QL SCN: NEGATIVE
BENZODIAZ UR QL SCN: NEGATIVE
BUPRENORPHINE SERPL-MCNC: NEGATIVE NG/ML
CANNABINOIDS SERPL QL: NEGATIVE
COCAINE UR QL: NEGATIVE
METHADONE UR QL SCN: NEGATIVE
OPIATES UR QL: NEGATIVE
OXYCODONE UR QL SCN: NEGATIVE
PCP UR QL SCN: NEGATIVE
PROPOXYPH UR QL: NEGATIVE
TRICYCLICS UR QL SCN: NEGATIVE

## 2022-04-15 PROCEDURE — 80306 DRUG TEST PRSMV INSTRMNT: CPT

## 2022-04-15 PROCEDURE — 84439 ASSAY OF FREE THYROXINE: CPT

## 2022-04-15 PROCEDURE — 80061 LIPID PANEL: CPT

## 2022-04-15 PROCEDURE — 99214 OFFICE O/P EST MOD 30 MIN: CPT | Performed by: NURSE PRACTITIONER

## 2022-04-15 PROCEDURE — 80050 GENERAL HEALTH PANEL: CPT

## 2022-04-15 PROCEDURE — 36415 COLL VENOUS BLD VENIPUNCTURE: CPT

## 2022-04-15 PROCEDURE — 82306 VITAMIN D 25 HYDROXY: CPT

## 2022-04-15 PROCEDURE — 83036 HEMOGLOBIN GLYCOSYLATED A1C: CPT

## 2022-04-15 RX ORDER — PHENTERMINE HYDROCHLORIDE 37.5 MG/1
37.5 CAPSULE ORAL EVERY MORNING
Qty: 30 CAPSULE | Refills: 0 | Status: SHIPPED | OUTPATIENT
Start: 2022-04-15 | End: 2022-05-12

## 2022-04-15 NOTE — PROGRESS NOTES
Subjective   Nicol Humphrey is a 36 y.o. female.     CC: Annual follow-up-anxiety, depression, SP parathyroidectomy, obesity    Obesity  This is a chronic problem. The current episode started more than 1 year ago. The problem occurs constantly. The problem has been waxing and waning. Pertinent negatives include no abdominal pain, arthralgias, change in bowel habit, chest pain, chills, congestion, coughing, fatigue, fever, myalgias, nausea, rash, sore throat, vomiting or weakness. The symptoms are aggravated by drinking and eating. She has tried eating, drinking and walking for the symptoms. The treatment provided no relief.   Anxiety  Presents for follow-up visit. Patient reports no chest pain, depressed mood, irritability, nausea, nervous/anxious behavior, palpitations, panic, restlessness, shortness of breath or suicidal ideas. Symptoms occur rarely. The quality of sleep is good. Nighttime awakenings: occasional.     Her past medical history is significant for depression. Compliance with medications is %.   Depression  Visit Type: follow-up  Patient is not experiencing: anhedonia, depressed mood, feelings of hopelessness, feelings of worthlessness, irritability, nervousness/anxiety, palpitations, panic, restlessness, shortness of breath, suicidal ideas, suicidal planning, thoughts of death, weight gain and weight loss.  Frequency of symptoms: rarely   Severity: mild   Sleep quality: good  Nighttime awakenings: occasional  Compliance with medications:  %             The following portions of the patient's history were reviewed and updated as appropriate: allergies, current medications, past family history, past medical history, past social history, past surgical history and problem list.    Review of Systems   Constitutional: Negative for activity change, appetite change, chills, fatigue, fever, irritability, unexpected weight gain and unexpected weight loss.   HENT: Negative for congestion, sore  throat, trouble swallowing and voice change.    Eyes: Negative.    Respiratory: Negative for cough, chest tightness, shortness of breath and wheezing.    Cardiovascular: Negative for chest pain, palpitations and leg swelling.   Gastrointestinal: Negative for abdominal pain, change in bowel habit, constipation, diarrhea, nausea and vomiting.   Endocrine: Negative.  Negative for cold intolerance, heat intolerance, polydipsia, polyphagia and polyuria.   Genitourinary: Negative for dysuria.   Musculoskeletal: Negative for arthralgias and myalgias.   Skin: Negative for rash.   Allergic/Immunologic: Negative.    Neurological: Negative for weakness.   Hematological: Negative.    Psychiatric/Behavioral: Negative for suicidal ideas and depressed mood. The patient is not nervous/anxious.        Objective   Physical Exam  Vitals and nursing note reviewed.   Constitutional:       General: She is not in acute distress.     Appearance: Normal appearance. She is well-developed. She is obese. She is not ill-appearing, toxic-appearing or diaphoretic.   HENT:      Head: Normocephalic and atraumatic.      Right Ear: External ear normal.      Left Ear: External ear normal.      Nose: Nose normal.   Eyes:      Conjunctiva/sclera: Conjunctivae normal.      Pupils: Pupils are equal, round, and reactive to light.   Neck:      Thyroid: No thyroid mass, thyromegaly or thyroid tenderness.      Trachea: No tracheal deviation.   Cardiovascular:      Rate and Rhythm: Normal rate and regular rhythm.      Heart sounds: Normal heart sounds. No murmur heard.    No friction rub. No gallop.   Pulmonary:      Effort: Pulmonary effort is normal. No respiratory distress.      Breath sounds: Normal breath sounds. No stridor. No wheezing, rhonchi or rales.   Abdominal:      General: Bowel sounds are normal. There is no distension.      Palpations: Abdomen is soft. There is no mass.      Tenderness: There is no abdominal tenderness. There is no guarding or  rebound.      Hernia: No hernia is present.   Musculoskeletal:         General: No tenderness. Normal range of motion.      Cervical back: Normal range of motion and neck supple.   Lymphadenopathy:      Cervical: No cervical adenopathy.   Skin:     General: Skin is warm and dry.      Coloration: Skin is not pale.      Findings: No erythema or rash.   Neurological:      Mental Status: She is alert and oriented to person, place, and time.      Cranial Nerves: No cranial nerve deficit.      Coordination: Coordination normal.   Psychiatric:         Mood and Affect: Mood normal.         Behavior: Behavior normal.         Thought Content: Thought content normal.         Judgment: Judgment normal.           Assessment/Plan   Diagnoses and all orders for this visit:    1. Annual physical exam (Primary)  -     CBC & Differential; Future  -     Comprehensive Metabolic Panel; Future  -     Hemoglobin A1c; Future  -     Lipid Panel; Future  -     TSH; Future  -     T4, Free; Future  -     Vitamin D 25 Hydroxy; Future, will call with results.    2. S/P parathyroidectomy (HCC)  -     Comprehensive Metabolic Panel; Future  -     Vitamin D 25 Hydroxy; Future, will call with results.  Continue vitamin D as prescribed.  We will continue to monitor.    3. Generalized anxiety disorder  -     TSH; Future  -     T4, Free; Future, will call with results.  Well-controlled with no suicidal homicidal ideations.  Continue Effexor and BuSpar as prescribed.  We will continue to monitor.    4. Recurrent major depressive disorder, in partial remission (HCC)   -Controlled with no suicidal homicidal ideations.  Plan of care stated above #3.  We will continue to monitor.    5. Class 2 obesity due to excess calories without serious comorbidity with body mass index (BMI) of 37.0 to 37.9 in adult  -     CBC & Differential; Future  -     Comprehensive Metabolic Panel; Future  -     Hemoglobin A1c; Future  -     Lipid Panel; Future  -     TSH; Future  -      T4, Free; Future  -     Urine Drug Screen - Urine, Clean Catch; Future  -     phentermine 37.5 MG capsule; Take 1 capsule by mouth Every Morning.  Dispense: 30 capsule; Refill: 0   - Patient has been making attempt through diet and exercise to lose weight but has been thus far unsuccessful.  We will check routine labs and call with results.  Patient has had phentermine in the past and tolerated well with no adverse side effects.  Risk and benefits of phentermine discussed with patient and patient agreed to proceed with phentermine therapy.  Darrion reviewed and appropriate.  Urine drug screen ordered and collected.  Controlled substance contract signed.  Instructed patient to follow a highly plant-based diet with lean meat protein choices, 1500-calorie day diet and exercise 3-5 times a week for least 30 minutes.  Phentermine will be discontinued after 3 months of use.  Patient instructed to discontinue phentermine should she have chest pain, shortness of breath, palpitations or anxiety.  Patient instructed present to the ER for any cardiac side effects.  Patient verbalized understanding of instruction and agrees with plan of care.  We will continue to monitor.    6.  Follow-up in 6 months or sooner for any acute needs.            This document has been electronically signed by ARIANNA Lo on April 15, 2022 13:23 CDT

## 2022-04-16 LAB
25(OH)D3 SERPL-MCNC: 24.5 NG/ML (ref 30–100)
ALBUMIN SERPL-MCNC: 4.7 G/DL (ref 3.5–5.2)
ALBUMIN/GLOB SERPL: 2.1 G/DL
ALP SERPL-CCNC: 72 U/L (ref 39–117)
ALT SERPL W P-5'-P-CCNC: 15 U/L (ref 1–33)
ANION GAP SERPL CALCULATED.3IONS-SCNC: 10.3 MMOL/L (ref 5–15)
AST SERPL-CCNC: 16 U/L (ref 1–32)
BASOPHILS # BLD AUTO: 0.02 10*3/MM3 (ref 0–0.2)
BASOPHILS NFR BLD AUTO: 0.4 % (ref 0–1.5)
BILIRUB SERPL-MCNC: 0.4 MG/DL (ref 0–1.2)
BUN SERPL-MCNC: 9 MG/DL (ref 6–20)
BUN/CREAT SERPL: 13.4 (ref 7–25)
CALCIUM SPEC-SCNC: 9.2 MG/DL (ref 8.6–10.5)
CHLORIDE SERPL-SCNC: 98 MMOL/L (ref 98–107)
CHOLEST SERPL-MCNC: 209 MG/DL (ref 0–200)
CO2 SERPL-SCNC: 28.7 MMOL/L (ref 22–29)
CREAT SERPL-MCNC: 0.67 MG/DL (ref 0.57–1)
DEPRECATED RDW RBC AUTO: 43 FL (ref 37–54)
EGFRCR SERPLBLD CKD-EPI 2021: 117.1 ML/MIN/1.73
EOSINOPHIL # BLD AUTO: 0.1 10*3/MM3 (ref 0–0.4)
EOSINOPHIL NFR BLD AUTO: 2 % (ref 0.3–6.2)
ERYTHROCYTE [DISTWIDTH] IN BLOOD BY AUTOMATED COUNT: 11.8 % (ref 12.3–15.4)
GLOBULIN UR ELPH-MCNC: 2.2 GM/DL
GLUCOSE SERPL-MCNC: 99 MG/DL (ref 65–99)
HBA1C MFR BLD: 4.8 % (ref 4.8–5.6)
HCT VFR BLD AUTO: 38.6 % (ref 34–46.6)
HDLC SERPL-MCNC: 58 MG/DL (ref 40–60)
HGB BLD-MCNC: 12.7 G/DL (ref 12–15.9)
IMM GRANULOCYTES # BLD AUTO: 0.02 10*3/MM3 (ref 0–0.05)
IMM GRANULOCYTES NFR BLD AUTO: 0.4 % (ref 0–0.5)
LDLC SERPL CALC-MCNC: 127 MG/DL (ref 0–100)
LDLC/HDLC SERPL: 2.14 {RATIO}
LYMPHOCYTES # BLD AUTO: 1.98 10*3/MM3 (ref 0.7–3.1)
LYMPHOCYTES NFR BLD AUTO: 39.4 % (ref 19.6–45.3)
MCH RBC QN AUTO: 32.3 PG (ref 26.6–33)
MCHC RBC AUTO-ENTMCNC: 32.9 G/DL (ref 31.5–35.7)
MCV RBC AUTO: 98.2 FL (ref 79–97)
MONOCYTES # BLD AUTO: 0.33 10*3/MM3 (ref 0.1–0.9)
MONOCYTES NFR BLD AUTO: 6.6 % (ref 5–12)
NEUTROPHILS NFR BLD AUTO: 2.58 10*3/MM3 (ref 1.7–7)
NEUTROPHILS NFR BLD AUTO: 51.2 % (ref 42.7–76)
NRBC BLD AUTO-RTO: 0 /100 WBC (ref 0–0.2)
PLATELET # BLD AUTO: 356 10*3/MM3 (ref 140–450)
PMV BLD AUTO: 9.9 FL (ref 6–12)
POTASSIUM SERPL-SCNC: 3.8 MMOL/L (ref 3.5–5.2)
PROT SERPL-MCNC: 6.9 G/DL (ref 6–8.5)
RBC # BLD AUTO: 3.93 10*6/MM3 (ref 3.77–5.28)
SODIUM SERPL-SCNC: 137 MMOL/L (ref 136–145)
T4 FREE SERPL-MCNC: 1 NG/DL (ref 0.93–1.7)
TRIGL SERPL-MCNC: 135 MG/DL (ref 0–150)
TSH SERPL DL<=0.05 MIU/L-ACNC: 1.76 UIU/ML (ref 0.27–4.2)
VLDLC SERPL-MCNC: 24 MG/DL (ref 5–40)
WBC NRBC COR # BLD: 5.03 10*3/MM3 (ref 3.4–10.8)

## 2022-04-18 NOTE — PROGRESS NOTES
Vitamin D level is low.  It had been within normal limits.  Can you ask if she is taking her vitamin D weekly as prescribed or if she needs a refill.  Slightly elevated cholesterol.  Low-fat diet weight loss recommended as discussed during last visit.  We will continue to monitor.  Follow-up in 6 months or sooner for any acute needs.

## 2022-05-12 DIAGNOSIS — E66.09 CLASS 2 OBESITY DUE TO EXCESS CALORIES WITHOUT SERIOUS COMORBIDITY WITH BODY MASS INDEX (BMI) OF 37.0 TO 37.9 IN ADULT: ICD-10-CM

## 2022-05-12 RX ORDER — PHENTERMINE HYDROCHLORIDE 37.5 MG/1
CAPSULE ORAL
Qty: 30 CAPSULE | Refills: 0 | Status: SHIPPED | OUTPATIENT
Start: 2022-05-12 | End: 2022-06-08 | Stop reason: SDUPTHER

## 2022-05-12 NOTE — PROGRESS NOTES
aDrrion reviewed and appropriate for phentermine prescription refilled on 5/12/2022.  Controlled substance contract and urine drug screen on file.

## 2022-06-08 DIAGNOSIS — E66.09 CLASS 2 OBESITY DUE TO EXCESS CALORIES WITHOUT SERIOUS COMORBIDITY WITH BODY MASS INDEX (BMI) OF 37.0 TO 37.9 IN ADULT: ICD-10-CM

## 2022-06-08 RX ORDER — PHENTERMINE HYDROCHLORIDE 37.5 MG/1
37.5 CAPSULE ORAL EVERY MORNING
Qty: 30 CAPSULE | Refills: 0 | OUTPATIENT
Start: 2022-06-08 | End: 2022-10-03

## 2022-06-08 NOTE — PROGRESS NOTES
Darrion reviewed and appropriate for phentermine prescription refilled on 6/8/2022.  Controlled substance contract and urine drug screen on file.

## 2022-10-03 PROBLEM — U07.1 COVID-19: Status: ACTIVE | Noted: 2022-10-03

## 2022-10-06 DIAGNOSIS — F33.41 RECURRENT MAJOR DEPRESSIVE DISORDER, IN PARTIAL REMISSION: ICD-10-CM

## 2022-10-06 DIAGNOSIS — F41.1 GENERALIZED ANXIETY DISORDER: ICD-10-CM

## 2022-10-06 RX ORDER — VENLAFAXINE HYDROCHLORIDE 150 MG/1
CAPSULE, EXTENDED RELEASE ORAL
Qty: 90 CAPSULE | Refills: 3 | Status: SHIPPED | OUTPATIENT
Start: 2022-10-06

## 2022-10-17 RX ORDER — HYDROXYZINE HYDROCHLORIDE 25 MG/1
25 TABLET, FILM COATED ORAL NIGHTLY PRN
Qty: 30 TABLET | Refills: 5 | Status: SHIPPED | OUTPATIENT
Start: 2022-10-17

## 2022-10-17 RX ORDER — DOCUSATE SODIUM 100 MG/1
CAPSULE, LIQUID FILLED ORAL
Qty: 60 CAPSULE | Refills: 5 | Status: SHIPPED | OUTPATIENT
Start: 2022-10-17

## 2023-01-11 RX ORDER — LINACLOTIDE 290 UG/1
CAPSULE, GELATIN COATED ORAL
Qty: 90 CAPSULE | Refills: 3 | Status: SHIPPED | OUTPATIENT
Start: 2023-01-11

## 2023-02-08 ENCOUNTER — OFFICE VISIT (OUTPATIENT)
Dept: FAMILY MEDICINE CLINIC | Facility: CLINIC | Age: 38
End: 2023-02-08
Payer: COMMERCIAL

## 2023-02-08 ENCOUNTER — LAB (OUTPATIENT)
Dept: LAB | Facility: HOSPITAL | Age: 38
End: 2023-02-08
Payer: COMMERCIAL

## 2023-02-08 VITALS
OXYGEN SATURATION: 99 % | TEMPERATURE: 97.1 F | RESPIRATION RATE: 18 BRPM | DIASTOLIC BLOOD PRESSURE: 76 MMHG | HEIGHT: 68 IN | WEIGHT: 217 LBS | HEART RATE: 90 BPM | BODY MASS INDEX: 32.89 KG/M2 | SYSTOLIC BLOOD PRESSURE: 118 MMHG

## 2023-02-08 DIAGNOSIS — R20.2 NUMBNESS AND TINGLING: ICD-10-CM

## 2023-02-08 DIAGNOSIS — R20.0 NUMBNESS AND TINGLING: ICD-10-CM

## 2023-02-08 DIAGNOSIS — K62.5 RECTAL BLEEDING: ICD-10-CM

## 2023-02-08 DIAGNOSIS — R41.3 MEMORY CHANGES: ICD-10-CM

## 2023-02-08 DIAGNOSIS — K59.04 CHRONIC IDIOPATHIC CONSTIPATION: ICD-10-CM

## 2023-02-08 DIAGNOSIS — R53.83 OTHER FATIGUE: Primary | ICD-10-CM

## 2023-02-08 DIAGNOSIS — R61 EXCESSIVE SWEATING: ICD-10-CM

## 2023-02-08 DIAGNOSIS — R53.83 OTHER FATIGUE: ICD-10-CM

## 2023-02-08 LAB
ALBUMIN SERPL-MCNC: 4.7 G/DL (ref 3.5–5.2)
ALBUMIN/GLOB SERPL: 2.2 G/DL
ALP SERPL-CCNC: 54 U/L (ref 39–117)
ALT SERPL W P-5'-P-CCNC: 14 U/L (ref 1–33)
ANION GAP SERPL CALCULATED.3IONS-SCNC: 10 MMOL/L (ref 5–15)
AST SERPL-CCNC: 16 U/L (ref 1–32)
BILIRUB SERPL-MCNC: 0.2 MG/DL (ref 0–1.2)
BUN SERPL-MCNC: 11 MG/DL (ref 6–20)
BUN/CREAT SERPL: 16.2 (ref 7–25)
CALCIUM SPEC-SCNC: 8.8 MG/DL (ref 8.6–10.5)
CHLORIDE SERPL-SCNC: 99 MMOL/L (ref 98–107)
CO2 SERPL-SCNC: 27 MMOL/L (ref 22–29)
CREAT SERPL-MCNC: 0.68 MG/DL (ref 0.57–1)
EGFRCR SERPLBLD CKD-EPI 2021: 115.9 ML/MIN/1.73
FERRITIN SERPL-MCNC: 80 NG/ML (ref 13–150)
GLOBULIN UR ELPH-MCNC: 2.1 GM/DL
GLUCOSE SERPL-MCNC: 80 MG/DL (ref 65–99)
HBA1C MFR BLD: 4.8 % (ref 4.8–5.6)
IRON 24H UR-MRATE: 70 MCG/DL (ref 37–145)
IRON SATN MFR SERPL: 16 % (ref 20–50)
MAGNESIUM SERPL-MCNC: 2 MG/DL (ref 1.6–2.6)
POTASSIUM SERPL-SCNC: 3.9 MMOL/L (ref 3.5–5.2)
PROT SERPL-MCNC: 6.8 G/DL (ref 6–8.5)
SODIUM SERPL-SCNC: 136 MMOL/L (ref 136–145)
T4 FREE SERPL-MCNC: 0.99 NG/DL (ref 0.93–1.7)
TIBC SERPL-MCNC: 440 MCG/DL (ref 298–536)
TRANSFERRIN SERPL-MCNC: 295 MG/DL (ref 200–360)
TSH SERPL DL<=0.05 MIU/L-ACNC: 2.16 UIU/ML (ref 0.27–4.2)
VIT B12 BLD-MCNC: 247 PG/ML (ref 211–946)

## 2023-02-08 PROCEDURE — 86235 NUCLEAR ANTIGEN ANTIBODY: CPT

## 2023-02-08 PROCEDURE — 82607 VITAMIN B-12: CPT

## 2023-02-08 PROCEDURE — 84466 ASSAY OF TRANSFERRIN: CPT

## 2023-02-08 PROCEDURE — 83036 HEMOGLOBIN GLYCOSYLATED A1C: CPT

## 2023-02-08 PROCEDURE — 80050 GENERAL HEALTH PANEL: CPT

## 2023-02-08 PROCEDURE — 99214 OFFICE O/P EST MOD 30 MIN: CPT | Performed by: NURSE PRACTITIONER

## 2023-02-08 PROCEDURE — 36415 COLL VENOUS BLD VENIPUNCTURE: CPT

## 2023-02-08 PROCEDURE — 83735 ASSAY OF MAGNESIUM: CPT

## 2023-02-08 PROCEDURE — 82728 ASSAY OF FERRITIN: CPT

## 2023-02-08 PROCEDURE — 86225 DNA ANTIBODY NATIVE: CPT

## 2023-02-08 PROCEDURE — 86376 MICROSOMAL ANTIBODY EACH: CPT

## 2023-02-08 PROCEDURE — 83540 ASSAY OF IRON: CPT

## 2023-02-08 PROCEDURE — 84439 ASSAY OF FREE THYROXINE: CPT

## 2023-02-08 NOTE — PROGRESS NOTES
Subjective   Nicol Humphrey is a 37 y.o. female.     History of Present Illness  CC: Fatigue, excessive sweating, memory loss/changes, numbness of tongue and fingertips, rectal bleeding  Fatigue  This is a chronic problem. The current episode started more than 1 year ago. The problem occurs constantly. The problem has been unchanged. Associated symptoms include fatigue. Pertinent negatives include no abdominal pain, arthralgias, chest pain, congestion, coughing, myalgias, nausea, rash, sore throat or vomiting. Nothing aggravates the symptoms. She has tried rest and sleep for the symptoms. The treatment provided no relief.   Memory Loss  This is a chronic problem. The current episode started more than 1 month ago. The problem has been waxing and waning. Associated symptoms include fatigue. Pertinent negatives include no abdominal pain, arthralgias, chest pain, congestion, coughing, myalgias, nausea, rash, sore throat or vomiting. Nothing aggravates the symptoms. She has tried nothing for the symptoms. The treatment provided no relief.   Rectal Bleeding  This is a recurrent problem. The current episode started more than 1 month ago. The problem occurs intermittently. The problem has been waxing and waning. Associated symptoms include fatigue. Pertinent negatives include no abdominal pain, arthralgias, chest pain, congestion, coughing, myalgias, nausea, rash, sore throat or vomiting. Associated symptoms comments: Chronic constipation  . Exacerbated by: constipation. Treatments tried: increased water intake, linzess, colace. The treatment provided no relief.        The following portions of the patient's history were reviewed and updated as appropriate: allergies, current medications, past family history, past medical history, past social history, past surgical history and problem list.    Review of Systems   Constitutional: Positive for fatigue. Negative for activity change, appetite change, unexpected weight gain  and unexpected weight loss.   HENT: Negative for congestion, sore throat, trouble swallowing and voice change.    Eyes: Negative.    Respiratory: Negative for cough, chest tightness, shortness of breath and wheezing.    Cardiovascular: Negative for chest pain, palpitations and leg swelling.   Gastrointestinal: Positive for anal bleeding, blood in stool, constipation and hematochezia. Negative for abdominal distention, abdominal pain, diarrhea, nausea, rectal pain, vomiting, GERD and indigestion.   Endocrine: Negative.    Genitourinary: Negative for dysuria.   Musculoskeletal: Negative for arthralgias and myalgias.   Skin: Negative for rash.   Allergic/Immunologic: Negative.    Neurological: Positive for memory problem.   Hematological: Negative.    Psychiatric/Behavioral: Negative.        Objective   Physical Exam  Vitals and nursing note reviewed.   Constitutional:       General: She is not in acute distress.     Appearance: Normal appearance. She is well-developed. She is obese. She is not ill-appearing, toxic-appearing or diaphoretic.   HENT:      Head: Normocephalic and atraumatic.   Eyes:      Conjunctiva/sclera: Conjunctivae normal.   Neck:      Thyroid: No thyroid mass, thyromegaly or thyroid tenderness.   Cardiovascular:      Rate and Rhythm: Normal rate and regular rhythm.      Heart sounds: Normal heart sounds.   Pulmonary:      Effort: Pulmonary effort is normal. No respiratory distress.      Breath sounds: Normal breath sounds. No stridor. No wheezing, rhonchi or rales.   Musculoskeletal:         General: No tenderness. Normal range of motion.      Cervical back: Normal range of motion.   Skin:     General: Skin is warm and dry.      Coloration: Skin is not pale.      Findings: No erythema or rash.   Neurological:      Mental Status: She is alert and oriented to person, place, and time.   Psychiatric:         Mood and Affect: Mood normal.         Behavior: Behavior normal.         Thought Content:  Thought content normal.         Judgment: Judgment normal.           Assessment & Plan   Diagnoses and all orders for this visit:    1. Other fatigue (Primary)  -     [facility]UEL Comprehensive Panel; Future  -     CBC & Differential; Future  -     Comprehensive Metabolic Panel; Future  -     Hemoglobin A1c; Future  -     TSH; Future  -     T4, Free; Future  -     Thyroid Peroxidase Antibody; Future, will call with results.  Patient presents today with multiple complaints that have been ongoing for a while (at least 1 month or more).  The symptoms include fatigue, excessive sweating, rectal bleeding, constipation numbness and tingling in fingers, memory changes.  Unclear at this time if this is all related to a single condition or multiple issues occurring at the same time.  Labs of been ordered for further evaluation.    2. Excessive sweating  -     CBC & Differential; Future  -     Comprehensive Metabolic Panel; Future  -     Hemoglobin A1c; Future  -     TSH; Future  -     T4, Free; Future  -     Thyroid Peroxidase Antibody; Future, will call with results    3. Rectal bleeding  -     CBC & Differential; Future  -     Ferritin; Future  -     Vitamin B12; Future  -     Iron Profile; Future  -     Ambulatory Referral to Gastroenterology, likely secondary to chronic constipation.  Patient reports she also notices blood mixed in with her stool.  She states blood is moderately dark but not black or bright red.  Patient has increased her water intake, is compliant with Linzess and Colace but still has chronic constipation.  In addition to labs will refer to GI for further evaluation as she likely needs a colonoscopy.  Patient verbalized understanding of instruction agrees plan of care.      4. Numbness and tingling  -     [facility] Comprehensive Panel; Future  -     CBC & Differential; Future  -     Comprehensive Metabolic Panel; Future  -     Hemoglobin A1c; Future  -     TSH; Future  -     T4, Free; Future  -     Thyroid Peroxidase  Antibody; Future, will call with results    5. Memory changes  -     CBC & Differential; Future  -     Comprehensive Metabolic Panel; Future  -     Hemoglobin A1c; Future  -     TSH; Future  -     T4, Free; Future  -     Magnesium; Future  -     Ferritin; Future  -     Vitamin B12; Future  -     Iron Profile; Future, will call with results    6. Chronic idiopathic constipation  -     Ambulatory Referral to Gastroenterology, follow-up as scheduled.  Plan of care stated above #3.    7.  Further follow-up plan of care pending lab results.            This document has been electronically signed by ARIANNA Lo on February 8, 2023 12:06 CST

## 2023-02-09 DIAGNOSIS — E61.1 IRON DEFICIENCY: Primary | ICD-10-CM

## 2023-02-09 LAB
BASOPHILS # BLD AUTO: 0.02 10*3/MM3 (ref 0–0.2)
BASOPHILS NFR BLD AUTO: 0.4 % (ref 0–1.5)
CENTROMERE B AB SER-ACNC: <0.2 AI (ref 0–0.9)
CHROMATIN AB SERPL-ACNC: <0.2 AI (ref 0–0.9)
DEPRECATED RDW RBC AUTO: 40.4 FL (ref 37–54)
DSDNA AB SER-ACNC: <1 IU/ML (ref 0–9)
ENA JO1 AB SER-ACNC: <0.2 AI (ref 0–0.9)
ENA RNP AB SER-ACNC: <0.2 AI (ref 0–0.9)
ENA SCL70 AB SER-ACNC: <0.2 AI (ref 0–0.9)
ENA SM AB SER-ACNC: <0.2 AI (ref 0–0.9)
ENA SS-A AB SER-ACNC: 0.2 AI (ref 0–0.9)
ENA SS-B AB SER-ACNC: <0.2 AI (ref 0–0.9)
EOSINOPHIL # BLD AUTO: 0.05 10*3/MM3 (ref 0–0.4)
EOSINOPHIL NFR BLD AUTO: 0.9 % (ref 0.3–6.2)
ERYTHROCYTE [DISTWIDTH] IN BLOOD BY AUTOMATED COUNT: 11.8 % (ref 12.3–15.4)
HCT VFR BLD AUTO: 36.4 % (ref 34–46.6)
HGB BLD-MCNC: 12 G/DL (ref 12–15.9)
IMM GRANULOCYTES # BLD AUTO: 0.01 10*3/MM3 (ref 0–0.05)
IMM GRANULOCYTES NFR BLD AUTO: 0.2 % (ref 0–0.5)
LYMPHOCYTES # BLD AUTO: 1.82 10*3/MM3 (ref 0.7–3.1)
LYMPHOCYTES NFR BLD AUTO: 34.1 % (ref 19.6–45.3)
Lab: NORMAL
MCH RBC QN AUTO: 31.3 PG (ref 26.6–33)
MCHC RBC AUTO-ENTMCNC: 33 G/DL (ref 31.5–35.7)
MCV RBC AUTO: 94.8 FL (ref 79–97)
MONOCYTES # BLD AUTO: 0.42 10*3/MM3 (ref 0.1–0.9)
MONOCYTES NFR BLD AUTO: 7.9 % (ref 5–12)
NEUTROPHILS NFR BLD AUTO: 3.02 10*3/MM3 (ref 1.7–7)
NEUTROPHILS NFR BLD AUTO: 56.5 % (ref 42.7–76)
NRBC BLD AUTO-RTO: 0 /100 WBC (ref 0–0.2)
PLATELET # BLD AUTO: 342 10*3/MM3 (ref 140–450)
PMV BLD AUTO: 9.8 FL (ref 6–12)
RBC # BLD AUTO: 3.84 10*6/MM3 (ref 3.77–5.28)
THYROPEROXIDASE AB SERPL-ACNC: <9 IU/ML (ref 0–34)
WBC NRBC COR # BLD: 5.34 10*3/MM3 (ref 3.4–10.8)

## 2023-02-09 RX ORDER — VITAMIN A, VITAMIN C, VITAMIN D-3, VITAMIN E, VITAMIN B-1, VITAMIN B-2, NIACIN, VITAMIN B-6, CALCIUM, IRON, ZINC, COPPER 4000; 120; 400; 22; 1.84; 3; 20; 10; 1; 12; 200; 27; 25; 2 [IU]/1; MG/1; [IU]/1; MG/1; MG/1; MG/1; MG/1; MG/1; MG/1; UG/1; MG/1; MG/1; MG/1; MG/1
1 TABLET ORAL DAILY
Qty: 90 TABLET | Refills: 3 | Status: SHIPPED | OUTPATIENT
Start: 2023-02-09

## 2023-02-09 NOTE — PROGRESS NOTES
Hemoglobin within normal limits but barely.  I saturation low.  I am going to start her on a prenatal vitamin daily instead of iron supplement so as not to make her constipation worse.  Prescription sent to pharmacy.  B12 within normal limits below.  Take daily prenatal vitamin as recommended.  Other labs stable or within normal limits.  SAMUEL pending.

## 2023-03-08 DIAGNOSIS — E89.2 S/P PARATHYROIDECTOMY: ICD-10-CM

## 2023-03-08 RX ORDER — ERGOCALCIFEROL 1.25 MG/1
50000 CAPSULE ORAL WEEKLY
Qty: 4 CAPSULE | Refills: 11 | Status: SHIPPED | OUTPATIENT
Start: 2023-03-08

## (undated) DEVICE — ADHS LIQ MASTISOL 2/3ML

## (undated) DEVICE — PK MAJ PROC LF 60

## (undated) DEVICE — SHEET,DRAPE,53X77,STERILE: Brand: MEDLINE

## (undated) DEVICE — INTENDED TO BE USED TO OCCLUDE, RETRACT AND IDENTIFY ARTERIES, VEINS, TENDONS AND NERVES IN SURGICAL PROCEDURES: Brand: STERION®  VESSEL LOOP

## (undated) DEVICE — GAUZE,SPONGE,4"X4",16PLY,XRAY,STRL,LF: Brand: MEDLINE

## (undated) DEVICE — GLV SURG TRIUMPH LT PF LTX 6 STRL

## (undated) DEVICE — SPNG DISSCT SECTO KTTNER PK/5

## (undated) DEVICE — SUT VICRYL 3-0 SH-1 PO 18IN J772D

## (undated) DEVICE — SUT SILK 3-0 TIES 18IN SA64H

## (undated) DEVICE — GLV SURG TRIUMPH PF LTX 6.5 STRL

## (undated) DEVICE — GLV SURG TRIUMPH LT PF LTX 7.5 STRL

## (undated) DEVICE — SUT VIC 3/0 SH 27IN J416H

## (undated) DEVICE — SUT SILK B SUTUPK 2/0 18IN SA65H

## (undated) DEVICE — 3M™ STERI-STRIP™ REINFORCED ADHESIVE SKIN CLOSURES, R1547, 1/2 IN X 4 IN (12 MM X 100 MM), 6 STRIPS/ENVELOPE: Brand: 3M™ STERI-STRIP™

## (undated) DEVICE — STPLR SKIN VISISTAT WD 35CT

## (undated) DEVICE — DISPOSABLE BIPOLAR CODE, 12' (3.66 M): Brand: CONMED

## (undated) DEVICE — SUT SILK 4/0 18IN SA63H

## (undated) DEVICE — SINGLE-USE BIOPSY FORCEPS: Brand: RADIAL JAW 4

## (undated) DEVICE — CONTAINER,SPECIMEN,OR STERILE,4OZ: Brand: MEDLINE

## (undated) DEVICE — ANTIBACTERIAL UNDYED BRAIDED (POLYGLACTIN 910), SYNTHETIC ABSORBABLE SUTURE: Brand: COATED VICRYL

## (undated) DEVICE — DRSNG TELFA PAD NONADH STR 1S 3X8IN

## (undated) DEVICE — POSTN HD RING CUSH 9IN LF

## (undated) DEVICE — GLV SURG SENSICARE PI PF LF 7 GRN STRL

## (undated) DEVICE — SUT PERMAHAND SILK 3/0 SH1 18IN

## (undated) DEVICE — GLV SURG SENSICARE PI LF PF 8 GRN STRL

## (undated) DEVICE — BITEBLOCK ENDO W/STRAP 60F A/ LF DISP

## (undated) DEVICE — COUNT NDL FOAM STRIP W/MAG 60CT

## (undated) DEVICE — SOL IRR NACL 0.9PCT BT 1000ML

## (undated) DEVICE — SUT PROLN 3/0 SH D/A 36IN 8522H

## (undated) DEVICE — GLV SURG SENSICARE POLYISPRN W/ALOE PF LF 6.5 GRN STRL

## (undated) DEVICE — SUT VIC 3/0 TIES 18IN J110T